# Patient Record
Sex: FEMALE | Race: WHITE | Employment: PART TIME | URBAN - METROPOLITAN AREA
[De-identification: names, ages, dates, MRNs, and addresses within clinical notes are randomized per-mention and may not be internally consistent; named-entity substitution may affect disease eponyms.]

---

## 2021-11-10 ENCOUNTER — HOSPITAL ENCOUNTER (OUTPATIENT)
Dept: PHYSICAL THERAPY | Age: 51
Discharge: HOME | End: 2021-11-10
Payer: OTHER GOVERNMENT

## 2021-11-10 PROCEDURE — 97140 MANUAL THERAPY 1/> REGIONS: CPT

## 2021-11-10 PROCEDURE — 97110 THERAPEUTIC EXERCISES: CPT

## 2021-11-10 PROCEDURE — 97162 PT EVAL MOD COMPLEX 30 MIN: CPT

## 2021-11-10 NOTE — PROGRESS NOTES
PHYSICAL THERAPY - DAILY TREATMENT NOTE    Patient Name: Jailyn Pacheco        Date: 11/10/2021  : 1970   YES Patient  Verified  Visit #:     Insurance: Payor: MARC / Plan: Ivonne Sharif / Product Type:  /      In time: 930 Out time:    Total Treatment Time: 60     BCBS/Medicare Time Tracking (below)   Total Timed Codes (min):  NA 1:1 Treatment Time:  NA     TREATMENT AREA =  Pain in left shoulder [M25.512]    SUBJECTIVE  Pain Level (on 0 to 10 scale):  2- 10   Medication Changes/New allergies or changes in medical history, any new surgeries or procedures? NO    If yes, update Summary List   Subjective Functional Status/Changes:  []  No changes reported     Patient is a 54 y.o. female who presents to [facility] with complaints of L shoulder pain following a fall onto L arm while playing tennis causing dislocation and avulsion fracture of L shoulder on 2021. Modalities Rationale:     decrease edema, decrease inflammation and decrease pain to improve patient's ability to perform pain-free ADLs.     min [] Estim, type/location:                                      []  att     []  unatt     []  w/US     []  w/ice    []  w/heat    min []  Mechanical Traction: type/lbs                   []  pro   []  sup   []  int   []  cont    []  before manual    []  after manual    min []  Ultrasound, settings/location:      min []  Iontophoresis w/ dexamethasone, location:                                               []  take home patch       []  in clinic   10 min [x]  Ice     []  Heat    location/position: L shoulder supine    min []  Vasopneumatic Device, press/temp:    If using vaso (only need to measure limb vaso being performed on)      pre-treatment girth :       post-treatment girth :       measured at (landmark location) :      min []  Other:    [x] Skin assessment post-treatment (if applicable):    [x]  intact    [x]  redness- no adverse reaction                  []redness  adverse reaction:        10 min Therapeutic Exercise:  [x]  See flow sheet   Rationale:      increase ROM, increase strength and improve coordination to improve the patients ability to perform unlimited ADLs. 10 min Manual Therapy: PROM tinto flexion, ER and abd with oscillations at end range   Rationale:      decrease pain, increase ROM, increase tissue extensibility and decrease edema  to improve patient's ability to improve ROM. The manual therapy interventions were performed at a separate and distinct time from the therapeutic activities interventions. Billed With/As:   [x] TE   [] TA   [] Neuro   [] Self Care Patient Education: [x] Review HEP    [] Progressed/Changed HEP based on:   [] positioning   [] body mechanics   [] transfers   [] heat/ice application    [] other:      Other Objective/Functional Measures:    FOTO 51     Post Treatment Pain Level (on 0 to 10) scale:   2  / 10     ASSESSMENT  Assessment/Changes in Function:     See POC     []  See Progress Note/Recertification   Patient will continue to benefit from skilled PT services to modify and progress therapeutic interventions, address functional mobility deficits, address ROM deficits, address strength deficits, analyze and address soft tissue restrictions, analyze and cue movement patterns, analyze and modify body mechanics/ergonomics and assess and modify postural abnormalities to attain remaining goals. Progress toward goals / Updated goals:    See POC for new short and long term goals.       PLAN  [x]  Upgrade activities as tolerated YES Continue plan of care   []  Discharge due to :    []  Other:      Therapist: Alan Guardado DPT    Date: 11/10/2021 Time: 1:27 PM     Future Appointments   Date Time Provider Carlos Mustafa   11/12/2021  2:15 PM Denzil Mcburney, PT [facility]   11/16/2021 10:15 AM Denzil Mcburney, PT [facility]   11/18/2021 10:15 AM Denzil Mcburney, PT [facility]   11/22/2021 11:00 AM Marcie Wasserman, PT [place] [facility] [place] [facility]   11/24/2021 11:00 AM Marcie Wasserman, PT [place] [facility] [place] [facility]   12/1/2021 10:15 AM Marcie Wasserman, PT [place] [facility] [place] [facility]   12/2/2021 10:15 AM Marcie Wasserman, PT [place] [facility] [place] [facility]   12/8/2021 10:15 AM Marcie Wasserman, PT [facility]   12/9/2021 11:00 AM Marcie Wasserman, PT [place] [facility] [place] [facility]   12/15/2021 10:15 AM Marcie Wasserman, PT [place] [facility] [place] [facility]   12/16/2021 10:15 AM Marcie Wasserman, PT [place] [facility] [place] [facility]   12/22/2021 10:15 AM Marcie Wasserman, [facility] [place] [facility] [place] [facility]   12/23/2021 10:15 AM Marcie Wasserman, PT [place] [facility] [place] [facility]   12/29/2021 10:15 AM Marcie Wasserman, PT [place] [facility] [place] [facility]   12/30/2021 10:15 AM Marcie Wasserman, PT [facility]

## 2021-11-10 NOTE — PROGRESS NOTES
[facility] PHYSICAL THERAPY AT [address] [place] [address]  [address], [address], [address]  Phone: [phone]  Fax: [phone] / [address] [address] PHYSICAL THERAPY SERVICES  Patient Name: Gisella Purvis : 1970   Medical   Diagnosis: Pain in left shoulder [M25.512] Treatment Diagnosis: L shoulder pain   Onset Date: 2021     Referral Source: Juliette Kaminski MD Start of Care [facility]): 11/10/2021   Prior Hospitalization: See medical history Provider #: [id number]   Prior Level of Function: Tennis and spin pain   Comorbidities: Recent weight gain   Medications: Verified on Patient Summary List   The Plan of Care and following information is based on the information from the initial evaluation.   ===========================================================================================  Assessment / key information:  Patient is a 54 y.o. female who presents to [facility] with complaints of L shoulder pain after a fall onto arm while playing tennis on 2021. Xrays showed L shoulder dislocation and avulsion fracture of greater tuberosity. Pt reported nerve pain after having shoulder relocated, which was managed by gabapentin and has since ceased. Pt was in a sling for 6 weeks, which was just removed 2021. Pt reports pain ranging from 2-6/10 which has increased since removal of sling; reports mild, short term relief with tylenol and naproxen. Pt presents today with:  L shoulder PROM: 105 deg flexion, 35 deg abd, 20 deg ER; dec posterior capsular mobility; restrictions noted in L pec muscle with notable rounded shoulder posture; MMT was withheld today due to fracture healing protocol. Patient sings and sx are consistent with pain/discomfort following L shoulder dislocation and humerus fracture. An initial HEP was provided to improve PROM; awaiting initiation of AROM until 8 weeks after injury.  Physical Therapy services will be beneficial in order to decrease pain, improve ROM, strength and stability in order to resume recreational activity and PLOF.   ===========================================================================================  Eval Complexity: History MEDIUM  Complexity : 1-2 comorbidities / personal factors will impact the outcome/ POC ;  Examination  MEDIUM Complexity : 3 Standardized tests and measures addressing body structure, function, activity limitation and / or participation in recreation ; Presentation MEDIUM Complexity : Evolving with changing characteristics ; Decision Making MEDIUM Complexity : FOTO score of 26-74; Overall Complexity MEDIUM  Problem List: pain affecting function, decrease ROM, decrease strength, edema affecting function, impaired gait/ balance, decrease ADL/ functional abilitiies, decrease activity tolerance and decrease flexibility/ joint mobility   Treatment Plan may include any combination of the following: Therapeutic exercise, Therapeutic activities, Neuromuscular re-education, Physical agent/modality, Gait/balance training, Manual therapy, Patient education, Self Care training and Functional mobility training  Patient / Family readiness to learn indicated by: asking questions, trying to perform skills and interest  Persons(s) to be included in education: patient (P)  Barriers to Learning/Limitations: None  Measures taken, if barriers to learning:    Patient Goal (s): \"ROM to return to tennis and work\"   Patient self reported health status: good  Rehabiliation Potential: good   Short Term Goals: To be accomplished in  4  weeks:  1. Patient will be independent and compliant with initial HEP. 2. Patient will report decreased pain levels to 0/10 in order to sleep through the night pain-free  3. Demonstrate improved L shoulder PROM to full and pain-free in order to improve dressing ability   Long Term Goals: To be accomplished in  8  weeks:  1.  Patient will be independent and compliant with progressive HEP for L shoulder stability/strength in order to maintain gains made with physical therapy  2. Improve FOTO score from 51 to > or = 70 in order to indicate improved ease with ADLs. 3. Demonstrate improved L shoulder strength to 4+/5 to improve patients ability to return to recreational activity  4. Demonstrate ability to perform 10 ball tosses without return of symptoms in order to improve pt ability to return to tennis. Frequency / Duration:   Patient to be seen  1-2  times per week for 6-8  weeks:  Patient / Caregiver education and instruction: self care, activity modification and exercises  Therapist Signature: Fay Barajas DPT Date: 33/12/3380   Certification Period: NA Time: 9:23 AM   ===========================================================================================  I certify that the above Physical Therapy Services are being furnished while the patient is under my care. I agree with the treatment plan and certify that this therapy is necessary. Physician Signature:        Date:       Time:     Please sign and return to [facility] [place] or you may fax the signed copy to [phone]. Thank you.

## 2021-11-12 ENCOUNTER — HOSPITAL ENCOUNTER (OUTPATIENT)
Dept: PHYSICAL THERAPY | Age: 51
Discharge: HOME | End: 2021-11-12
Payer: OTHER GOVERNMENT

## 2021-11-12 PROCEDURE — 97140 MANUAL THERAPY 1/> REGIONS: CPT

## 2021-11-12 PROCEDURE — 97110 THERAPEUTIC EXERCISES: CPT

## 2021-11-12 NOTE — PROGRESS NOTES
PHYSICAL THERAPY - DAILY TREATMENT NOTE    Patient Name: Seb Westbrook        Date: 2021  : 1970   YES Patient  Verified  Visit #:     Insurance: Payor:  / Plan: Alda Shermant / Product Type:  /      In time: 220 Out time: 300   Total Treatment Time: 40     BCBS/Medicare Time Tracking (below)   Total Timed Codes (min):  NA 1:1 Treatment Time:  NA     TREATMENT AREA =  Pain in left shoulder [M25.512]    SUBJECTIVE  Pain Level (on 0 to 10 scale):  2-3  / 10   Medication Changes/New allergies or changes in medical history, any new surgeries or procedures? NO    If yes, update Summary List   Subjective Functional Status/Changes:  []  No changes reported     I fell down the stairs two days ago and landed on my L arm. I dont think I hurt anything physically but it didn't feel good           Modalities Rationale:     decrease edema, decrease inflammation and decrease pain to improve patient's ability to perform pain-free ADLs.     min [] Estim, type/location:                                      []  att     []  unatt     []  w/US     []  w/ice    []  w/heat    min []  Mechanical Traction: type/lbs                   []  pro   []  sup   []  int   []  cont    []  before manual    []  after manual    min []  Ultrasound, settings/location:      min []  Iontophoresis w/ dexamethasone, location:                                               []  take home patch       []  in clinic   HEP min [x]  Ice     []  Heat    location/position: L shoulder supine    min []  Vasopneumatic Device, press/temp:    If using vaso (only need to measure limb vaso being performed on)      pre-treatment girth :       post-treatment girth :       measured at (landmark location) :      min []  Other:    [x] Skin assessment post-treatment (if applicable):    [x]  intact    [x]  redness- no adverse reaction                  []redness  adverse reaction:        30 min Therapeutic Exercise:  [x]  See flow sheet   Rationale:      increase ROM, increase strength and improve coordination to improve the patients ability to perform unlimited ADLs. 10 min Manual Therapy: PROM into flexion, ABD and ER with oscillations at end range to promote muscle relaxation   Rationale:      decrease pain, increase ROM, increase tissue extensibility and decrease edema  to improve patient's ability to improve PROM per protocol  The manual therapy interventions were performed at a separate and distinct time from the therapeutic activities interventions. Billed With/As:   [x] TE   [] TA   [] Neuro   [] Self Care Patient Education: [x] Review HEP    [] Progressed/Changed HEP based on:   [] positioning   [] body mechanics   [] transfers   [] heat/ice application    [] other:      Other Objective/Functional Measures:    Initiated TE per FS- continue with PROM and gentle AAROM until 8 weeks s/p fracture     Post Treatment Pain Level (on 0 to 10) scale:   2  / 10     ASSESSMENT  Assessment/Changes in Function:     Improved tolerance to stretching and AAROM with PT assist today following cues for scapular placement and prevention of UT recruitment. []  See Progress Note/Recertification   Patient will continue to benefit from skilled PT services to modify and progress therapeutic interventions, address functional mobility deficits, address ROM deficits, address strength deficits, analyze and address soft tissue restrictions, analyze and cue movement patterns, analyze and modify body mechanics/ergonomics and assess and modify postural abnormalities to attain remaining goals. Progress toward goals / Updated goals:    Progressing towards STG 3.       PLAN  [x]  Upgrade activities as tolerated YES Continue plan of care   []  Discharge due to :    []  Other:      Therapist: Marisa Watson DPT    Date: 11/12/2021 Time: 11:05 AM     Future Appointments   Date Time Provider Carlos Mustafa   11/12/2021  2:15 PM Adwoa Myrick, PT [facility]   11/16/2021 10:15 AM Emanuel Burgess, PT [facility]   11/18/2021 10:15 AM [facility] Burg[facility]   11/22/2021 11:00 AM [facility] Burg[facility], PT [facility] [place] [facility]   11/24/2021 11:00 AM Emanuel Burgess, PT [place] [facility] [place] [facility]   12/1/2021 10:15 AM [facility] [place] [facility]   12/2/2021 10:15 AM Emanuel Burgess, PT [facility] [place] [facility]   12/8/2021 10:15 AM Emanuel Burgess, PT [facility]   12/9/2021 11:00 AM Emanuel Burgess, PT [place] [facility] [place] [facility]   12/15/2021 10:15 AM Emanuel Burgess, PT [place] [facility] [place] [facility]   12/16/2021 10:15 AM Emanuel Burgess, PT [place] [facility] [place] [facility]   12/22/2021 10:15 AM Emanuel Burgess, PT [place] [facility] [place] [facility]   12/23/2021 10:15 AM Emanuel Burgess, PT [place] [facility] [place] [facility]   12/29/2021 10:15 AM Emanuel Burgess, PT [place] [facility] [place] [facility]   12/30/2021 10:15 AM Emanuel Burgess, PT [facility]

## 2021-11-16 ENCOUNTER — HOSPITAL ENCOUNTER (OUTPATIENT)
Dept: PHYSICAL THERAPY | Age: 51
Discharge: HOME | End: 2021-11-16
Payer: OTHER GOVERNMENT

## 2021-11-16 PROCEDURE — 97110 THERAPEUTIC EXERCISES: CPT

## 2021-11-16 PROCEDURE — 97140 MANUAL THERAPY 1/> REGIONS: CPT

## 2021-11-16 NOTE — PROGRESS NOTES
PHYSICAL THERAPY - DAILY TREATMENT NOTE    Patient Name: Johnna Mckeon        Date: 2021  : 1970   YES Patient  Verified  Visit #:   3   of   12  Insurance: Payor:  / Plan: Mychal Langston 76 / Product Type:  /      In time:  Out time:    Total Treatment Time: 55     BCBS/Medicare Time Tracking (below)   Total Timed Codes (min):  NA 1:1 Treatment Time:  NA     TREATMENT AREA =  Pain in left shoulder [M25.512]    SUBJECTIVE  Pain Level (on 0 to 10 scale):  2-3  / 10   Medication Changes/New allergies or changes in medical history, any new surgeries or procedures? NO    If yes, update Summary List   Subjective Functional Status/Changes:  []  No changes reported     Last night was the first night I slept without waking up from pain. I have a little pinching with some of my stretching, I stop when that happens so it doesn't get worse. Modalities Rationale:     decrease edema, decrease inflammation and decrease pain to improve patient's ability to perform pain-free ADLs.     min [] Estim, type/location:                                      []  att     []  unatt     []  w/US     []  w/ice    []  w/heat    min []  Mechanical Traction: type/lbs                   []  pro   []  sup   []  int   []  cont    []  before manual    []  after manual    min []  Ultrasound, settings/location:      min []  Iontophoresis w/ dexamethasone, location:                                               []  take home patch       []  in clinic   10 min []  Ice     []  Heat    location/position:     min []  Vasopneumatic Device, press/temp:    If using vaso (only need to measure limb vaso being performed on)      pre-treatment girth :       post-treatment girth :       measured at (landmark location) :      min []  Other:    [] Skin assessment post-treatment (if applicable):    []  intact    []  redness- no adverse reaction                  []redness  adverse reaction:        35 min Therapeutic Exercise:  [x]  See flow sheet   Rationale:      increase ROM, increase strength and improve coordination to improve the patients ability to perform unlimited ADLs. 10 min Manual Therapy: PROM into flexion, ER and abd with oscillations at end range to promote muscle relaxation; grade 1 posterior mobs of GHJ   Rationale:      decrease pain, increase ROM, increase tissue extensibility and decrease edema  to improve patient's ability to improve PROM per fracture protocol  The manual therapy interventions were performed at a separate and distinct time from the therapeutic activities interventions. Billed With/As:   [x] TE   [] TA   [] Neuro   [] Self Care Patient Education: [x] Review HEP    [] Progressed/Changed HEP based on:   [] positioning   [] body mechanics   [] transfers   [] heat/ice application    [] other:      Other Objective/Functional Measures: Added finger ladder, pulleys and prone row      Post Treatment Pain Level (on 0 to 10) scale:   2  / 10     ASSESSMENT  Assessment/Changes in Function:     Emphasis on today's treatment was targeting mobility to the posterior capsule during AROM and gentle stretching Pt tolerated new exercises well and reported that \"pinching\" decreased with cues for scapular placement and prevention of UT recruitment. []  See Progress Note/Recertification   Patient will continue to benefit from skilled PT services to modify and progress therapeutic interventions, address functional mobility deficits, address ROM deficits, address strength deficits, analyze and address soft tissue restrictions and analyze and cue movement patterns to attain remaining goals. Progress toward goals / Updated goals:    Progressing towards STG 3.       PLAN  [x]  Upgrade activities as tolerated YES Continue plan of care   []  Discharge due to :    []  Other:      Therapist: Enid Fleming DPT    Date: 11/16/2021 Time: 10:23 AM     Future Appointments   Date Time Provider Department Center   11/18/2021 10:15 AM Saint Agatha Fuse, PT [place] [facility] [place] [facility]   11/22/2021 11:00 AM Saint Agatha Fuse, PT [place] [facility] [place] [facility]   11/24/2021 11:00 AM Carol Fuse, PT [place] [facility] [place] [facility]   12/1/2021 10:15 AM Carol Fuse, PT [place] [facility] [place] [facility]   12/2/2021 10:15 AM [facility] [place] [facility]   12/8/2021 10:15 AM Acrol Fuse, PT [place] [facility] [place] [facility]   12/9/2021 11:00 AM [facility]use[facility] [place] [facility]   12/15/2021 10:15 AM Saint Agatha Fuse, PT [place] [facility] [place] [facility]   12/16/2021 10:15 AM Saint Agatha Fuse, PT [place] [facility] [place] [facility]   12/22/2021 10:15 AM [facility] [place] [facility]   12/23/2021 10:15 AM Carol Fuse, PT [place] [facility] [place] [facility]   12/29/2021 10:15 AM Saint Agatha Fuse, PT [place] [facility] [place] [facility]   12/30/2021 10:15 AM Saint Agatha Fuse, PT [facility]

## 2021-11-18 ENCOUNTER — HOSPITAL ENCOUNTER (OUTPATIENT)
Dept: PHYSICAL THERAPY | Age: 51
Discharge: HOME | End: 2021-11-18
Payer: OTHER GOVERNMENT

## 2021-11-18 PROCEDURE — 97140 MANUAL THERAPY 1/> REGIONS: CPT

## 2021-11-18 PROCEDURE — 97110 THERAPEUTIC EXERCISES: CPT

## 2021-11-18 NOTE — PROGRESS NOTES
PHYSICAL THERAPY - DAILY TREATMENT NOTE    Patient Name: Asmita Garcia        Date: 2021  : 1970   YES Patient  Verified  Visit #:     Insurance: Payor:  / Plan: Mychal Langston 74 / Product Type:  /      In time:  Out time:    Total Treatment Time: 55     BCBS/Medicare Time Tracking (below)   Total Timed Codes (min):  NA 1:1 Treatment Time:  NA     TREATMENT AREA =  Pain in left shoulder [M25.512]    SUBJECTIVE  Pain Level (on 0 to 10 scale):  2  / 10   Medication Changes/New allergies or changes in medical history, any new surgeries or procedures? NO    If yes, update Summary List   Subjective Functional Status/Changes:  []  No changes reported     I havent been as sore recently but I still cant put my hair up or normally get a shirt on. Modalities Rationale:     decrease edema, decrease inflammation and decrease pain to improve patient's ability to perform pain-free ADLs.     min [] Estim, type/location:                                      []  att     []  unatt     []  w/US     []  w/ice    []  w/heat    min []  Mechanical Traction: type/lbs                   []  pro   []  sup   []  int   []  cont    []  before manual    []  after manual    min []  Ultrasound, settings/location:      min []  Iontophoresis w/ dexamethasone, location:                                               []  take home patch       []  in clinic   10 min [x]  Ice     []  Heat    location/position: L shoulder    min []  Vasopneumatic Device, press/temp:    If using vaso (only need to measure limb vaso being performed on)      pre-treatment girth :       post-treatment girth :       measured at (landmark location) :      min []  Other:    [x] Skin assessment post-treatment (if applicable):    [x]  intact    [x]  redness- no adverse reaction                  []redness  adverse reaction:        35 min Therapeutic Exercise:  [x]  See flow sheet   Rationale:      increase ROM, increase strength and improve coordination to improve the patients ability to perform unlimited ADLs. 10 min Manual Therapy: PROM into flexion, ER and abd with oscillations at end range to promote muscle relaxation; grade 1 posterior mobs of GHJ   Rationale:      decrease pain, increase ROM, increase tissue extensibility and decrease edema  to improve patient's ability to improve PROM. The manual therapy interventions were performed at a separate and distinct time from the therapeutic activities interventions. Billed With/As:   [x] TE   [] TA   [] Neuro   [] Self Care Patient Education: [x] Review HEP    [] Progressed/Changed HEP based on:   [] positioning   [] body mechanics   [] transfers   [] heat/ice application    [] other:      Other Objective/Functional Measures: Added supine punch and hammock stretch     Post Treatment Pain Level (on 0 to 10) scale:   2  / 10     ASSESSMENT  Assessment/Changes in Function:     Pt often reports discomfort with first repetition of each exercise that improves with dec incidence of muscle guarding and cues for scapular retraction. []  See Progress Note/Recertification   Patient will continue to benefit from skilled PT services to modify and progress therapeutic interventions, address functional mobility deficits, address ROM deficits, address strength deficits, analyze and address soft tissue restrictions, analyze and cue movement patterns, analyze and modify body mechanics/ergonomics and assess and modify postural abnormalities to attain remaining goals. Progress toward goals / Updated goals:    Progressing towards STG 3.      PLAN  [x]  Upgrade activities as tolerated YES Continue plan of care   []  Discharge due to :    []  Other:      Therapist: Brianna Amaral DPT    Date: 11/18/2021 Time: 10:18 AM     Future Appointments   Date Time Provider Carlos Mustafa   11/22/2021 11:00 AM Kristene Severe, PT [facility] [place] [facility]   11/24/2021 11:00 AM Kristene Severe, PT [facility]   12/1/2021 10:15 AM Nimo Persaud, PT [facility]   12/2/2021 10:15 AM Nimo Persaud, PT [facility] [place] [facility]   12/8/2021 10:15 AM Nimo Persaud, PT [facility]   12/9/2021 11:00 AM Nimo Persaud, PT [facility] [place] [facility]   12/15/2021 10:15 AM Nimo Persaud, PT [facility] [place] [facility]   12/16/2021 10:15 AM Nimo Persaud, PT [facility] [place] [facility]   12/22/2021 10:15 AM Nimo Persaud, PT [facility] [place] [facility]   12/23/2021 10:15 AM Nimo Persaud, PT [facility] [place] [facility]   12/29/2021 10:15 AM Nimo Persaud, PT [facility] [place] [facility]   12/30/2021 10:15 AM Nimo Persaud, PT [facility]

## 2021-11-22 ENCOUNTER — HOSPITAL ENCOUNTER (OUTPATIENT)
Dept: PHYSICAL THERAPY | Age: 51
Discharge: HOME | End: 2021-11-22
Payer: OTHER GOVERNMENT

## 2021-11-22 PROCEDURE — 97530 THERAPEUTIC ACTIVITIES: CPT

## 2021-11-22 PROCEDURE — 97110 THERAPEUTIC EXERCISES: CPT

## 2021-11-22 NOTE — PROGRESS NOTES
[facility]YS[facility]ER[facility] AT [address] [place] [address]  [facility], [address] [place] [address], [address]  Phone: [phone]  Fax: [phone]  PROGRESS NOTE  Patient Name: Kaleb Robles : 1970   Treatment/Medical Diagnosis: Pain in left shoulder [M25.512]   Referral Source: Michele Lane MD     Date of Initial Visit: 11/10/2021 Attended Visits: 5 Missed Visits: 0     SUMMARY OF TREATMENT  Gentle stretching of L shoulder and capsule, PROM-AAROM and initiation of AROM to tolerance, manual therapy to improve mobility  CURRENT STATUS  Ms. Luis Dc has been seen for 4 follow up visits following a L shoulder dislocation and fracture on 2021. Pt was compliant with sling use for 6 weeks and has been tolerating initiation of PROM and AAROM without reports of increase in pain. ROM is showing consistent improvements and pt is reporting inc independence with dressing and grooming demands. Pt has tolerate initiation of gentle AROM exercises well, although is still unable to lift weighted objects at home and often uses UT compensations for elevation. See below for objective measures:    Goal/Measure of Progress Goal Met? 1. Patient will be independent and compliant with initial HEP. Status at last Eval: - Current Status: Independent and compliant 7 days/week yes   2. Patient will report decreased pain levels to 0/10 in order to sleep through the night pain-luis a   Status at last Eval: 2/10 at best  6/10 at worst Current Status: 0/10 at best  2-3/10 at worst  Partially Met   3. Demonstrate improved L shoulder PROM to full and pain-free in order to improve dressing ability   Status at last Eval: 105 deg flexion  35 deg abd  20 deg ER Current Status: 139 deg flexion  88 deg abd  60 deg ER Progressing     New Goals to be achieved in __4-6__  weeks:  1.   Patient will be independent and compliant with progressive HEP for L shoulder stability/strength in order to maintain gains made with physical therapy   2. Improve FOTO score from 51 to > or = 70 in order to indicate improved ease with ADLs. 3.  Demonstrate improved L shoulder strength to 4+/5 to improve patients ability to return to recreational activity   4. Demonstrate ability to perform 10 ball tosses without return of symptoms in order to improve pt ability to return to tennis. RECOMMENDATIONS  Continue with skilled PT services 2x/week for 4-6 weeks to continue progressing towards goals and safely return to tennis. Pt is currently Thank you! If you have any questions/comments please contact us directly at [phone]. Thank you for allowing us to assist in the care of your patient. Therapist Signature: Milagros Lane DPT Date: 11/22/2021     Time: 11:19 AM   NOTE TO PHYSICIAN:  PLEASE COMPLETE THE ORDERS BELOW AND FAX TO   [facility]: [phone]. If you are unable to process this request in 24 hours please contact our office: [phone].    ___ I have read the above report and request that my patient continue as recommended.   ___ I have read the above report and request that my patient continue therapy with the following changes/special instructions:_________________________________________________________   ___ I have read the above report and request that my patient be discharged from therapy.      Physician Signature:        Date:       Time:

## 2021-11-22 NOTE — PROGRESS NOTES
PHYSICAL THERAPY - DAILY TREATMENT NOTE    Patient Name: Asmita Garcia        Date: 2021  : 1970   YES Patient  Verified  Visit #:     Insurance: Payor:  / Plan: Mychal Langston 74 / Product Type:  /      In time: 1100 Out time: 9570   Total Treatment Time: 55     BCBS/Medicare Time Tracking (below)   Total Timed Codes (min):  NA 1:1 Treatment Time:  NA     TREATMENT AREA =  Pain in left shoulder [M25.512]    SUBJECTIVE  Pain Level (on 0 to 10 scale):  1-2  / 10   Medication Changes/New allergies or changes in medical history, any new surgeries or procedures? NO    If yes, update Summary List   Subjective Functional Status/Changes:  []  No changes reported     I can get my hand to the top of my head to brush it but it gets really tired. Modalities Rationale:     decrease edema, decrease inflammation and decrease pain to improve patient's ability to perform pain-free ADLs.     min [] Estim, type/location:                                      []  att     []  unatt     []  w/US     []  w/ice    []  w/heat    min []  Mechanical Traction: type/lbs                   []  pro   []  sup   []  int   []  cont    []  before manual    []  after manual    min []  Ultrasound, settings/location:      min []  Iontophoresis w/ dexamethasone, location:                                               []  take home patch       []  in clinic   10 min [x]  Ice     []  Heat    location/position: L shoulder supine    min []  Vasopneumatic Device, press/temp:    If using vaso (only need to measure limb vaso being performed on)      pre-treatment girth :       post-treatment girth :       measured at (landmark location) :      min []  Other:    [x] Skin assessment post-treatment (if applicable):    [x]  intact    [x]  redness- no adverse reaction                  []redness  adverse reaction:        30 min Therapeutic Exercise:  [x]  See flow sheet   Rationale:      increase ROM, increase strength, improve coordination and increase proprioception to improve the patients ability to perform unlimited ADLs. 15 min Therapeutic Activity: [x]  See flow sheet   Rationale:    increase strength, improve coordination and increase proprioception to improve the patients ability to improve reaching abilities. Billed With/As:   [x] TE   [] TA   [] Neuro   [] Self Care Patient Education: [x] Review HEP    [] Progressed/Changed HEP based on:   [] positioning   [] body mechanics   [] transfers   [] heat/ice application    [] other:      Other Objective/Functional Measures:    See PN    Added SL ER and sleeper stretch     Post Treatment Pain Level (on 0 to 10) scale:   2  / 10     ASSESSMENT  Assessment/Changes in Function:     See PN to MD     []  See Progress Note/Recertification   Patient will continue to benefit from skilled PT services to modify and progress therapeutic interventions, address functional mobility deficits, address ROM deficits, address strength deficits, analyze and address soft tissue restrictions, analyze and cue movement patterns, analyze and modify body mechanics/ergonomics and assess and modify postural abnormalities to attain remaining goals. Progress toward goals / Updated goals:    See PN for progress towards goals.       PLAN  [x]  Upgrade activities as tolerated YES Continue plan of care   []  Discharge due to :    []  Other:      Therapist: Arely Champion DPT    Date: 11/22/2021 Time: 11:16 AM     Future Appointments   Date Time Provider Carlos Mustafa   11/24/2021 11:00 AM Boom Catalan, PT [facility]   12/1/2021 10:15 AM Boom Catalan, PT [facility]   12/2/2021 10:15 AM Boom Catalan, PT [facility]   12/8/2021 10:15 AM Boom Catalan, PT [facility]   12/9/2021 11:00 AM Boom Catalan, PT [facility]   12/15/2021 10:15 AM Boom Catalan, PT [facility]   12/16/2021 10:15 AM Boom Catalan, PT [facility]   12/22/2021 10:15 AM [facility]   12/23/2021 10:15 AM Kathie Mukherjee, PT [facility]   12/29/2021 10:15 AM Kathie Mukherjee, PT [facility]   12/30/2021 10:15 AM Kathie Mukherjee, PT [facility]

## 2021-11-24 ENCOUNTER — HOSPITAL ENCOUNTER (OUTPATIENT)
Dept: PHYSICAL THERAPY | Age: 51
Discharge: HOME | End: 2021-11-24
Payer: OTHER GOVERNMENT

## 2021-11-24 PROCEDURE — 97110 THERAPEUTIC EXERCISES: CPT

## 2021-11-24 PROCEDURE — 97530 THERAPEUTIC ACTIVITIES: CPT

## 2021-11-24 PROCEDURE — 97140 MANUAL THERAPY 1/> REGIONS: CPT

## 2021-11-24 NOTE — PROGRESS NOTES
PHYSICAL THERAPY - DAILY TREATMENT NOTE    Patient Name: Leslye Lindsay        Date: 2021  : 1970   YES Patient  Verified  Visit #:      12  Insurance: Payor:  / Plan: Mychal Langston 74 / Product Type:  /      In time:  Out time:    Total Treatment Time: 55     BCBS/Medicare Time Tracking (below)   Total Timed Codes (min):  NA 1:1 Treatment Time:  NA     TREATMENT AREA =  Pain in left shoulder [M25.512]    SUBJECTIVE  Pain Level (on 0 to 10 scale):  1-2  / 10   Medication Changes/New allergies or changes in medical history, any new surgeries or procedures? NO    If yes, update Summary List   Subjective Functional Status/Changes:  []  No changes reported     The doctor said things look good and I can go back to work next week with a 10 lb weight limit. Modalities Rationale:     decrease edema, decrease inflammation and decrease pain to improve patient's ability to perform pain-free ADLs.     min [] Estim, type/location:                                      []  att     []  unatt     []  w/US     []  w/ice    []  w/heat    min []  Mechanical Traction: type/lbs                   []  pro   []  sup   []  int   []  cont    []  before manual    []  after manual    min []  Ultrasound, settings/location:      min []  Iontophoresis w/ dexamethasone, location:                                               []  take home patch       []  in clinic   10 min [x]  Ice     []  Heat    location/position: L shoulder supine    min []  Vasopneumatic Device, press/temp:    If using vaso (only need to measure limb vaso being performed on)      pre-treatment girth :       post-treatment girth :       measured at (landmark location) :      min []  Other:    [x] Skin assessment post-treatment (if applicable):    [x]  intact    [x]  redness- no adverse reaction                  []redness  adverse reaction:        20 min Therapeutic Exercise:  [x]  See flow sheet   Rationale: increase ROM, increase strength and improve coordination to improve the patients ability to perform unlimited ADLs. 10 min Manual Therapy: PROM into flexion, ER and abd with and without scapular stabilization; grade 1 posterior/inferior mobs of GHJ   Rationale:      decrease pain, increase ROM, increase tissue extensibility and decrease edema  to improve patient's ability to improve ROM  The manual therapy interventions were performed at a separate and distinct time from the therapeutic activities interventions. 15 min Therapeutic Activity: [x]  See flow sheet   Rationale:    increase ROM, increase strength, improve coordination and improve balance to improve the patients ability to resume reaching activities    Billed With/As:   [x] TE   [] TA   [] Neuro   [] Self Care Patient Education: [x] Review HEP    [] Progressed/Changed HEP based on:   [] positioning   [] body mechanics   [] transfers   [] heat/ice application    [] other:      Other Objective/Functional Measures: Added supine punch 45 and 60 deg elevation, added SL abd     Post Treatment Pain Level (on 0 to 10) scale:   1  / 10     ASSESSMENT  Assessment/Changes in Function:     Continues to show improvements in ROM, noted mild discomfort and stretch to posterior capsule today that occurred without impingement symptoms. Encouraged pt to continue with RC strengthening exercises at home to progress towards OH motions. []  See Progress Note/Recertification   Patient will continue to benefit from skilled PT services to modify and progress therapeutic interventions, address functional mobility deficits, address ROM deficits, address strength deficits, analyze and address soft tissue restrictions, analyze and cue movement patterns, analyze and modify body mechanics/ergonomics and assess and modify postural abnormalities to attain remaining goals. Progress toward goals / Updated goals:    Progressing towards STG 3.       PLAN  [x]  Upgrade activities as tolerated YES Continue plan of care   []  Discharge due to :    []  Other:      Therapist: Phyllis Chow DPT    Date: 11/24/2021 Time: 11:50 AM     Future Appointments   Date Time Provider Carlos Mustafa   12/1/2021 10:15 AM Carla Jewish, PT [facility]   12/2/2021 10:15 AM Carla Jewish, PT [facility]   12/8/2021 10:15 AM Carla Jewish, PT [facility]   12/9/2021 11:00 AM Carla Jewish, PT [facility]   12/15/2021 10:15 AM Carla Jewish, PT [facility]   12/16/2021 10:15 AM Carla Jewish, PT [facility]   12/22/2021 10:15 AM Carla Jewish, PT [facility]   12/23/2021 10:15 AM Carla Jewish, PT [facility]   12/29/2021 10:15 AM Carla Jewish, PT [facility]   12/30/2021 10:15 AM Carla Jewish, PT [facility]

## 2021-12-01 ENCOUNTER — APPOINTMENT (OUTPATIENT)
Dept: PHYSICAL THERAPY | Age: 51
End: 2021-12-01
Payer: OTHER GOVERNMENT

## 2021-12-02 ENCOUNTER — HOSPITAL ENCOUNTER (OUTPATIENT)
Dept: PHYSICAL THERAPY | Age: 51
Discharge: HOME | End: 2021-12-02
Payer: OTHER GOVERNMENT

## 2021-12-02 PROCEDURE — 97140 MANUAL THERAPY 1/> REGIONS: CPT

## 2021-12-02 PROCEDURE — 97530 THERAPEUTIC ACTIVITIES: CPT

## 2021-12-02 PROCEDURE — 97110 THERAPEUTIC EXERCISES: CPT

## 2021-12-02 NOTE — PROGRESS NOTES
PHYSICAL THERAPY - DAILY TREATMENT NOTE    Patient Name: Leslye Lindsay        Date: 2021  : 1970   YES Patient  Verified  Visit #:     Insurance: Payor: MARC / Plan: Mychal Langston 74 / Product Type:  /      In time: 8413 Out time:    Total Treatment Time: 55     BCBS/Medicare Time Tracking (below)   Total Timed Codes (min):  NA 1:1 Treatment Time:  NA     TREATMENT AREA =  Pain in left shoulder [M25.512]    SUBJECTIVE  Pain Level (on 0 to 10 scale):  1-2  / 10   Medication Changes/New allergies or changes in medical history, any new surgeries or procedures? NO    If yes, update Summary List   Subjective Functional Status/Changes:  []  No changes reported     I started working this week and it just felt super tired with everything I did. 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 Modalities Rationale:     decrease edema, decrease inflammation and decrease pain to improve patient's ability to perform pain-free ADLs.     min [] Estim, type/location:                                      []  att     []  unatt     []  w/US     []  w/ice    []  w/heat    min []  Mechanical Traction: type/lbs                   []  pro   []  sup   []  int   []  cont    []  before manual    []  after manual    min []  Ultrasound, settings/location:      min []  Iontophoresis w/ dexamethasone, location:                                               []  take home patch       []  in clinic   10 min [x]  Ice     []  Heat    location/position: L shoulder supine    min []  Vasopneumatic Device, press/temp:    If using vaso (only need to measure limb vaso being performed on)      pre-treatment girth :       post-treatment girth :       measured at (landmark location) :      min []  Other:    [x] Skin assessment post-treatment (if applicable):    [x]  intact    [x]  redness- no adverse reaction                  []redness  adverse reaction: 20 min Therapeutic Exercise:  [x]  See flow sheet   Rationale:      increase ROM, increase strength and improve coordination to improve the patients ability to perform unlimited ADLs. 10 min Manual Therapy: PROM into flexion, ER and abd with and without scapular stabilization; grade 1 posterior/inferior mobs of GHJ   Rationale:      decrease pain, increase ROM, increase tissue extensibility and decrease edema  to improve patient's ability to improve ROM  The manual therapy interventions were performed at a separate and distinct time from the therapeutic activities interventions. 15 min Therapeutic Activity: [x]  See flow sheet   Rationale:    increase ROM, increase strength, improve coordination and improve balance to improve the patients ability to resume reaching activities    Billed With/As:   [x] TE   [] TA   [] Neuro   [] Self Care Patient Education: [x] Review HEP    [] Progressed/Changed HEP based on:   [] positioning   [] body mechanics   [] transfers   [] heat/ice application    [] other:      Other Objective/Functional Measures: Added supine protraction and RC circles, 1# weight to SL ER/ABD, 2# to prone row     Post Treatment Pain Level (on 0 to 10) scale:   1  / 10     ASSESSMENT  Assessment/Changes in Function:     Pt was quick to fatigue throughout all new exercises today but reported no pain. Encouraged to take a rest day tomorrow from strengthening exercises and continue with stretching in order to prevent inflammation in L shoulder. []  See Progress Note/Recertification   Patient will continue to benefit from skilled PT services to modify and progress therapeutic interventions, address functional mobility deficits, address ROM deficits, address strength deficits, analyze and address soft tissue restrictions, analyze and cue movement patterns, analyze and modify body mechanics/ergonomics and assess and modify postural abnormalities to attain remaining goals.    Progress toward goals / Updated goals:    Progressing towards STG 3.       PLAN  [x]  Upgrade activities as tolerated YES Continue plan of care   []  Discharge due to :    []  Other:      Therapist: Maxime Ochoa DPT    Date: 12/2/2021 Time: 11:50 AM     Future Appointments   Date Time Provider Carlos Mustafa   12/8/2021 10:15 AM Marlyn Ortiz, PT [facility]   12/9/2021 11:00 AM Marlyn Ortiz, PT [facility]   12/15/2021 10:15 AM Marlyn Holiday, PT [facility]   12/16/2021 10:15 AM Marlyn Holiday, PT [facility]   12/22/2021 10:15 AM Marlyn Holiday, PT [facility]   12/23/2021 10:15 AM Marlyn Holiday, PT [facility]   12/29/2021 10:15 AM Marlyn Holiday, PT [facility]   12/30/2021 10:15 AM Marlyn Holiday, PT [facility]

## 2021-12-08 ENCOUNTER — APPOINTMENT (OUTPATIENT)
Dept: PHYSICAL THERAPY | Age: 51
End: 2021-12-08
Payer: OTHER GOVERNMENT

## 2021-12-09 ENCOUNTER — HOSPITAL ENCOUNTER (OUTPATIENT)
Dept: PHYSICAL THERAPY | Age: 51
Discharge: HOME | End: 2021-12-09
Payer: OTHER GOVERNMENT

## 2021-12-09 PROCEDURE — 97110 THERAPEUTIC EXERCISES: CPT

## 2021-12-09 PROCEDURE — 97530 THERAPEUTIC ACTIVITIES: CPT

## 2021-12-09 PROCEDURE — 97140 MANUAL THERAPY 1/> REGIONS: CPT

## 2021-12-09 NOTE — PROGRESS NOTES
PHYSICAL THERAPY - DAILY TREATMENT NOTE    Patient Name: Johnna Mckeon        Date: 2021  : 1970   YES Patient  Verified  Visit #:     Insurance: Payor:  / Plan: Mychal Langston 74 / Product Type:  /      In time: 1100 Out time: 1200   Total Treatment Time: 55     BCBS/Medicare Time Tracking (below)   Total Timed Codes (min):  NA 1:1 Treatment Time:  NA     TREATMENT AREA =  Pain in left shoulder [M25.512]    SUBJECTIVE  Pain Level (on 0 to 10 scale):  0  / 10   Medication Changes/New allergies or changes in medical history, any new surgeries or procedures? NO    If yes, update Summary List   Subjective Functional Status/Changes:  []  No changes reported     I only feel pain when my arm works against resistance. Modalities Rationale:     decrease edema, decrease inflammation and decrease pain to improve patient's ability to perform pain-free ADLs.     min [] Estim, type/location:                                      []  att     []  unatt     []  w/US     []  w/ice    []  w/heat    min []  Mechanical Traction: type/lbs                   []  pro   []  sup   []  int   []  cont    []  before manual    []  after manual    min []  Ultrasound, settings/location:      min []  Iontophoresis w/ dexamethasone, location:                                               []  take home patch       []  in clinic   10 min [x]  Ice     []  Heat    location/position: L shoulder supine    min []  Vasopneumatic Device, press/temp:    If using vaso (only need to measure limb vaso being performed on)      pre-treatment girth :       post-treatment girth :       measured at (landmark location) :      min []  Other:    [x] Skin assessment post-treatment (if applicable):    [x]  intact    [x]  redness- no adverse reaction                  []redness  adverse reaction:        20 min Therapeutic Exercise:  [x]  See flow sheet   Rationale:      increase ROM, increase strength, improve coordination and improve balance to improve the patients ability to perform unlimited ADLs. 10 min Manual Therapy: PROM into flexion, ER and abd with and without scapular stabilization; grade 1 posterior/inferior mobs of GHJ   Rationale:      decrease pain, increase ROM, increase tissue extensibility and decrease edema  to improve patient's ability to improve PROM   The manual therapy interventions were performed at a separate and distinct time from the therapeutic activities interventions. 15 min Therapeutic Activity: [x]  See flow sheet   Rationale:    increase strength, improve coordination and increase proprioception to improve the patients ability to resume overhead activities    Billed With/As:   [x] TE   [] TA   [] Neuro   [] Self Care Patient Education: [x] Review HEP    [] Progressed/Changed HEP based on:   [] positioning   [] body mechanics   [] transfers   [] heat/ice application    [] other:      Other Objective/Functional Measures: Added prone horz abd, Tband IT/ER and FR on wall     Post Treatment Pain Level (on 0 to 10) scale:   1  / 10     ASSESSMENT  Assessment/Changes in Function:     Good tolerance to new exercises although pt was quick to fatigue with all scapular and RC strengthening exercises. Encouraged to utilize scapular musculature with lifting exercises at work. []  See Progress Note/Recertification   Patient will continue to benefit from skilled PT services to modify and progress therapeutic interventions, address functional mobility deficits, address ROM deficits, address strength deficits, analyze and address soft tissue restrictions, analyze and cue movement patterns, analyze and modify body mechanics/ergonomics and assess and modify postural abnormalities to attain remaining goals. Progress toward goals / Updated goals:    Progressing towards LTG 3.       PLAN  [x]  Upgrade activities as tolerated YES Continue plan of care   []  Discharge due to :    []  Other: Therapist: Enid Fleming DPT    Date: 12/9/2021 Time: 11:10 AM     Future Appointments   Date Time Provider Carlos Mustafa   12/15/2021 10:15 AM Eligio Mayberry, PT [facility]   12/16/2021 10:15 AM Eligio Mayberry, PT [facility]   12/22/2021 10:15 AM Eligio Mayberry, PT [facility]   12/23/2021 10:15 AM Eligio Mayberry, PT [facility]   12/29/2021 10:15 AM Eligio Mayberry, PT [facility]   12/30/2021 10:15 AM Eligio Mayberry, PT [facility]

## 2021-12-15 ENCOUNTER — HOSPITAL ENCOUNTER (OUTPATIENT)
Dept: PHYSICAL THERAPY | Age: 51
Discharge: HOME | End: 2021-12-15
Payer: OTHER GOVERNMENT

## 2021-12-15 PROCEDURE — 97530 THERAPEUTIC ACTIVITIES: CPT

## 2021-12-15 PROCEDURE — 97110 THERAPEUTIC EXERCISES: CPT

## 2021-12-15 PROCEDURE — 97140 MANUAL THERAPY 1/> REGIONS: CPT

## 2021-12-15 NOTE — PROGRESS NOTES
PHYSICAL THERAPY - DAILY TREATMENT NOTE    Patient Name: Johnna Mckeon        Date: 12/15/2021  : 1970   YES Patient  Verified  Visit #:     Insurance: Payor:  / Plan: Mychal Langston 74 / Product Type:  /      In time: 6594 Out time: 9891   Total Treatment Time: 60     BCBS/Medicare Time Tracking (below)   Total Timed Codes (min):  NA 1:1 Treatment Time:  NA     TREATMENT AREA =  Pain in left shoulder [M25.512]    SUBJECTIVE  Pain Level (on 0 to 10 scale):  0  / 10   Medication Changes/New allergies or changes in medical history, any new surgeries or procedures? NO    If yes, update Summary List   Subjective Functional Status/Changes:  []  No changes reported     I have trouble only when im reaching high. I just use the other arm. Modalities Rationale:     decrease edema, decrease inflammation and decrease pain to improve patient's ability to perform pain-free ADLs.     min [] Estim, type/location:                                      []  att     []  unatt     []  w/US     []  w/ice    []  w/heat    min []  Mechanical Traction: type/lbs                   []  pro   []  sup   []  int   []  cont    []  before manual    []  after manual    min []  Ultrasound, settings/location:      min []  Iontophoresis w/ dexamethasone, location:                                               []  take home patch       []  in clinic   10 min [x]  Ice     []  Heat    location/position: L shoulder supine    min []  Vasopneumatic Device, press/temp:    If using vaso (only need to measure limb vaso being performed on)      pre-treatment girth :       post-treatment girth :       measured at (landmark location) :      min []  Other:    [x] Skin assessment post-treatment (if applicable):    [x]  intact    [x]  redness- no adverse reaction                  []redness  adverse reaction:        20 min Therapeutic Exercise:  [x]  See flow sheet   Rationale:      increase ROM, increase strength, improve coordination and improve balance to improve the patients ability to perform unlimited ADLs. 10 min Manual Therapy: PROM into flexion, ER and abd with and without scapular stabilization; grade 1 posterior/inferior mobs of GHJ   Rationale:      decrease pain, increase ROM, increase tissue extensibility and decrease edema  to improve patient's ability to improve PROM   The manual therapy interventions were performed at a separate and distinct time from the therapeutic activities interventions. 20 min Therapeutic Activity: [x]  See flow sheet   Rationale:    increase strength, improve coordination and increase proprioception to improve the patients ability to resume overhead activities    Billed With/As:   [x] TE   [] TA   [] Neuro   [] Self Care Patient Education: [x] Review HEP    [] Progressed/Changed HEP based on:   [] positioning   [] body mechanics   [] transfers   [] heat/ice application    [] other:      Other Objective/Functional Measures: Added supine punch with YTB for ER resistance, increased to OTB with IR/ER     Post Treatment Pain Level (on 0 to 10) scale:   1  / 10     ASSESSMENT  Assessment/Changes in Function:     No increase in symptoms during therex today although pt is quick to fatigue and must be cued to prevent compensations. []  See Progress Note/Recertification   Patient will continue to benefit from skilled PT services to modify and progress therapeutic interventions, address functional mobility deficits, address ROM deficits, address strength deficits, analyze and address soft tissue restrictions, analyze and cue movement patterns, analyze and modify body mechanics/ergonomics and assess and modify postural abnormalities to attain remaining goals. Progress toward goals / Updated goals:    Progressing towards LTG 3.       PLAN  [x]  Upgrade activities as tolerated YES Continue plan of care   []  Discharge due to :    []  Other:      Therapist: Jania Seay DPT    Date: 12/15/2021 Time: 11:10 AM     Future Appointments   Date Time Provider Carlos Mustafa   12/21/2021  8:00 AM Parisa Gibson, PT [facility]   12/22/2021 10:15 AM aPrisa Gibson, PT [facility]   12/29/2021 10:15 AM Parisa Gibson, PT [facility]   12/30/2021 10:15 AM Parisa Gibson, PT [facility]

## 2021-12-16 ENCOUNTER — APPOINTMENT (OUTPATIENT)
Dept: PHYSICAL THERAPY | Age: 51
End: 2021-12-16
Payer: OTHER GOVERNMENT

## 2021-12-16 ENCOUNTER — HOSPITAL ENCOUNTER (OUTPATIENT)
Dept: PHYSICAL THERAPY | Age: 51
Discharge: HOME | End: 2021-12-16
Payer: OTHER GOVERNMENT

## 2021-12-16 PROCEDURE — 97530 THERAPEUTIC ACTIVITIES: CPT

## 2021-12-16 PROCEDURE — 97110 THERAPEUTIC EXERCISES: CPT

## 2021-12-16 PROCEDURE — 97140 MANUAL THERAPY 1/> REGIONS: CPT

## 2021-12-16 NOTE — PROGRESS NOTES
PHYSICAL THERAPY - DAILY TREATMENT NOTE    Patient Name: Wilmer Boykin        Date: 2021  : 1970   YES Patient  Verified  Visit #:   10   of   12  Insurance: Payor: MARC / Plan: Mychal Langston 74 / Product Type:  /      In time: 9310 Out time:    Total Treatment Time: 55     BCBS/Medicare Time Tracking (below)   Total Timed Codes (min):  NA 1:1 Treatment Time:  NA     TREATMENT AREA =  Pain in left shoulder [M25.512]    SUBJECTIVE  Pain Level (on 0 to 10 scale):  0  / 10   Medication Changes/New allergies or changes in medical history, any new surgeries or procedures? NO    If yes, update Summary List   Subjective Functional Status/Changes:  []  No changes reported     Im not sore from yesterday, I just feel tight. Modalities Rationale:     decrease edema, decrease inflammation and decrease pain to improve patient's ability to perform pain-free ADLs.     min [] Estim, type/location:                                      []  att     []  unatt     []  w/US     []  w/ice    []  w/heat    min []  Mechanical Traction: type/lbs                   []  pro   []  sup   []  int   []  cont    []  before manual    []  after manual    min []  Ultrasound, settings/location:      min []  Iontophoresis w/ dexamethasone, location:                                               []  take home patch       []  in clinic   10 min [x]  Ice     []  Heat    location/position: L shoulder supine    min []  Vasopneumatic Device, press/temp:    If using vaso (only need to measure limb vaso being performed on)      pre-treatment girth :       post-treatment girth :       measured at (landmark location) :      min []  Other:    [x] Skin assessment post-treatment (if applicable):    [x]  intact    [x]  redness- no adverse reaction                  []redness  adverse reaction:        15 min Therapeutic Exercise:  [x]  See flow sheet   Rationale:      increase ROM, increase strength, improve coordination and improve balance to improve the patients ability to perform unlimited ADLs. 10 min Manual Therapy: PROM into flexion, ER and abd with and without scapular stabilization; grade 1 posterior/inferior mobs of GHJ   Rationale:      decrease pain, increase ROM, increase tissue extensibility and decrease edema  to improve patient's ability to improve PROM   The manual therapy interventions were performed at a separate and distinct time from the therapeutic activities interventions. 20 min Therapeutic Activity: [x]  See flow sheet   Rationale:    increase strength, improve coordination and increase proprioception to improve the patients ability to resume overhead activities    Billed With/As:   [x] TE   [] TA   [] Neuro   [] Self Care Patient Education: [x] Review HEP    [] Progressed/Changed HEP based on:   [] positioning   [] body mechanics   [] transfers   [] heat/ice application    [] other:      Other Objective/Functional Measures: Added counter walk back flexion stretch and corner pec stretch   Post Treatment Pain Level (on 0 to 10) scale:   1  / 10     ASSESSMENT  Assessment/Changes in Function:     Continues to lack approx 20 deg flexion and was able to reach only 90 deg scaption prior to UT compensations. []  See Progress Note/Recertification   Patient will continue to benefit from skilled PT services to modify and progress therapeutic interventions, address functional mobility deficits, address ROM deficits, address strength deficits, analyze and address soft tissue restrictions, analyze and cue movement patterns, analyze and modify body mechanics/ergonomics and assess and modify postural abnormalities to attain remaining goals. Progress toward goals / Updated goals:    Progressing towards LTG 3.       PLAN  [x]  Upgrade activities as tolerated YES Continue plan of care   []  Discharge due to :    []  Other:      Therapist: Diego Bernardo DPT    Date: 12/16/2021 Time: 11:10 AM Future Appointments   Date Time Provider Carlos Mustafa   12/21/2021  8:00 AM Benny Carrillo, PT [facility]   12/22/2021 10:15 AM Benny Carrillo, PT [facility]   12/29/2021 10:15 AM Benny Carrillo PT [facility]   12/30/2021 10:15 AM Benny Carrillo, PT [facility]

## 2021-12-21 ENCOUNTER — HOSPITAL ENCOUNTER (OUTPATIENT)
Dept: PHYSICAL THERAPY | Age: 51
Discharge: HOME | End: 2021-12-21
Payer: OTHER GOVERNMENT

## 2021-12-21 PROCEDURE — 97140 MANUAL THERAPY 1/> REGIONS: CPT

## 2021-12-21 PROCEDURE — 97110 THERAPEUTIC EXERCISES: CPT

## 2021-12-21 PROCEDURE — 97530 THERAPEUTIC ACTIVITIES: CPT

## 2021-12-21 NOTE — PROGRESS NOTES
PHYSICAL THERAPY - DAILY TREATMENT NOTE    Patient Name: Merlene Tang        Date: 2021  : 1970   YES Patient  Verified  Visit #:     Insurance: Payor: MARC / Plan: Mychal Langston 74 / Product Type:  /      In time: 800 Out time: 900   Total Treatment Time: 55     BCBS/Medicare Time Tracking (below)   Total Timed Codes (min):  NA 1:1 Treatment Time:  NA     TREATMENT AREA =  Pain in left shoulder [M25.512]    SUBJECTIVE  Pain Level (on 0 to 10 scale):  0  / 10   Medication Changes/New allergies or changes in medical history, any new surgeries or procedures? NO    If yes, update Summary List   Subjective Functional Status/Changes:  []  No changes reported     Main trouble is picking up IV bags with my L hand. Paulo Varner been having a lot of pinching in the back side of my shoulder. Modalities Rationale:     decrease edema, decrease inflammation and decrease pain to improve patient's ability to perform pain-free ADLs.     min [] Estim, type/location:                                      []  att     []  unatt     []  w/US     []  w/ice    []  w/heat    min []  Mechanical Traction: type/lbs                   []  pro   []  sup   []  int   []  cont    []  before manual    []  after manual    min []  Ultrasound, settings/location:      min []  Iontophoresis w/ dexamethasone, location:                                               []  take home patch       []  in clinic   10 min [x]  Ice     []  Heat    location/position: L shoulder supine    min []  Vasopneumatic Device, press/temp:    If using vaso (only need to measure limb vaso being performed on)      pre-treatment girth :       post-treatment girth :       measured at (landmark location) :      min []  Other:    [x] Skin assessment post-treatment (if applicable):    [x]  intact    [x]  redness- no adverse reaction                  []redness  adverse reaction:        15 min Therapeutic Exercise:  [x]  See flow sheet Rationale:      increase ROM, increase strength, improve coordination and improve balance to improve the patients ability to perform unlimited ADLs. 10 min Manual Therapy: PROM into flexion, ER and abd with and without scapular stabilization; grade 1 posterior/inferior mobs of GHJ   Rationale:      decrease pain, increase ROM, increase tissue extensibility and decrease edema  to improve patient's ability to improve PROM   The manual therapy interventions were performed at a separate and distinct time from the therapeutic activities interventions. 20 min Therapeutic Activity: [x]  See flow sheet   Rationale:    increase strength, improve coordination and increase proprioception to improve the patients ability to resume overhead activities    Billed With/As:   [x] TE   [] TA   [] Neuro   [] Self Care Patient Education: [x] Review HEP    [] Progressed/Changed HEP based on:   [] positioning   [] body mechanics   [] transfers   [] heat/ice application    [] other: educated pt on performing strengthening exercises only 3x/week to avoid inflammation and tendinitis in RC musculature     Other Objective/Functional Measures:    90 deg scaption before UT recruitment   Post Treatment Pain Level (on 0 to 10) scale:   0  / 10     ASSESSMENT  Assessment/Changes in Function:     Good tolerance to therex today with fatigue noted during RC and scapular exercises. []  See Progress Note/Recertification   Patient will continue to benefit from skilled PT services to modify and progress therapeutic interventions, address functional mobility deficits, address ROM deficits, address strength deficits, analyze and address soft tissue restrictions, analyze and cue movement patterns, analyze and modify body mechanics/ergonomics and assess and modify postural abnormalities to attain remaining goals. Progress toward goals / Updated goals:    Progressing towards LTG 3.       PLAN  [x]  Upgrade activities as tolerated YES Continue plan of care   []  Discharge due to :    []  Other:      Therapist: Brianna Amaral DPT    Date: 12/21/2021 Time: 11:10 AM     Future Appointments   Date Time Provider Carlos Mustafa   12/22/2021 10:15 AM Kristene Severe, PT [facility]   12/29/2021 10:15 AM Kristene Severe, PT [facility]   12/30/2021 10:15 AM Kristene Severe, PT [facility]

## 2021-12-22 ENCOUNTER — HOSPITAL ENCOUNTER (OUTPATIENT)
Dept: PHYSICAL THERAPY | Age: 51
Discharge: HOME | End: 2021-12-22
Payer: OTHER GOVERNMENT

## 2021-12-22 PROCEDURE — 97110 THERAPEUTIC EXERCISES: CPT

## 2021-12-22 PROCEDURE — 97530 THERAPEUTIC ACTIVITIES: CPT

## 2021-12-22 PROCEDURE — 97140 MANUAL THERAPY 1/> REGIONS: CPT

## 2021-12-22 NOTE — PROGRESS NOTES
[address] [facility] PHYSICAL THERAPY AT [address] [place] [address]  [address], [address] ,[address], [address]  Phone: [phone]  Fax: [phone]  PROGRESS NOTE  Patient Name: Lei Chris : 1970   Treatment/Medical Diagnosis: Pain in left shoulder [M25.512]   Referral Source: William Donahue MD     Date of Initial Visit: 11/10/2021 Attended Visits: 12 Missed Visits: 2     SUMMARY OF TREATMENT  Gentle stretching of L shoulder and capsule, PROM-AAROM and initiation of AROM to tolerance, manual therapy to improve mobility  CURRENT STATUS  Ms. Ines Bryant has been seen for 11 follow up visits following a L shoulder dislocation and avulsion fracture on 2021. She has made significant progress with ROM although is showing some capsular limitations and discomfort at end ranges. RC and scapular strength is improving and she has tolerated initiation of resistance and weighted exercises well. 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 Recently, pt has been reporting posterior shoulder pain/tenderness; she has been educated to perform resisted exercises no greater than 3 days a week to avoid tendinitis or other overuse injuries to the shoulder with PT in combination of full time work as a nurse. See below for objective measures:     Goal/Measure of Progress Goal Met? 1. Patient will report decreased pain levels to 0/10 in order to sleep through the night pain-luis a   Status at last Eval: 0/10 at best  2-3/10 at worst  Current Status: 0/10 at best  Discomfort at worst yes   2. Demonstrate improved L shoulder PROM to full and pain-free in order to improve dressing ability   Status at last Eval: 139 deg flexion  88 deg abd  60 deg ER Current Status: 156 deg flexion  105 deg abd  63 deg ER Progressing   3. Improve FOTO score from 51 to > or = 70 in order to indicate improved ease with ADLs.     Status at last Eval: 51 Current Status: 61 Progressing     New Goals to be achieved in __3__  weeks:  1. Patient will be independent and compliant with progressive HEP for L shoulder stability/strength in order to maintain gains made with physical therapy   2. Pt to demonstrate full pain-free AROM in order to tolerate return to recreational tennis   3. Demonstrate ability to perform 10 ball tosses without return of symptoms in order to improve pt ability to return to tennis.    4.  Demonstrate improved L shoulder strength to 4+/5 to improve patients ability to return to recreational activity       RECOMMENDATIONS  Continue with skilled PT services 2x/week for 3 weeks to meet above goals and facilitate safe return to recreational activity. Thank you! If you have any questions/comments please contact us directly at [phone]. Thank you for allowing us to assist in the care of your patient. Therapist Signature: Paola Guzman DPT Date: 12/22/2021     Time: 10:28 AM   NOTE TO PHYSICIAN:  PLEASE COMPLETE THE ORDERS BELOW AND FAX TO   [facility]: [phone]. If you are unable to process this request in 24 hours please contact our office: [phone].    ___ I have read the above report and request that my patient continue as recommended.   ___ I have read the above report and request that my patient continue therapy with the following changes/special instructions:_________________________________________________________   ___ I have read the above report and request that my patient be discharged from therapy.      Physician Signature:        Date:       Time:

## 2021-12-22 NOTE — PROGRESS NOTES
PHYSICAL THERAPY - DAILY TREATMENT NOTE    Patient Name: Rosa Vee        Date: 2021  : 1970   YES Patient  Verified  Visit #:   12   of   15  Insurance: Payor:  / Plan: Mychal Langston 74 / Product Type:  /      In time: 6556 Out time: 1120   Total Treatment Time: 55     BCBS/Medicare Time Tracking (below)   Total Timed Codes (min):  NA 1:1 Treatment Time:  NA     TREATMENT AREA =  Pain in left shoulder [M25.512]    SUBJECTIVE  Pain Level (on 0 to 10 scale):  0  / 10   Medication Changes/New allergies or changes in medical history, any new surgeries or procedures? NO    If yes, update Summary List   Subjective Functional Status/Changes:  []  No changes reported     I am frustrated im not 100%          Modalities Rationale:     decrease edema, decrease inflammation and decrease pain to improve patient's ability to perform pain-free ADLs.     min [] Estim, type/location:                                      []  att     []  unatt     []  w/US     []  w/ice    []  w/heat    min []  Mechanical Traction: type/lbs                   []  pro   []  sup   []  int   []  cont    []  before manual    []  after manual    min []  Ultrasound, settings/location:      min []  Iontophoresis w/ dexamethasone, location:                                               []  take home patch       []  in clinic   10 min [x]  Ice     []  Heat    location/position: L shoulder supine    min []  Vasopneumatic Device, press/temp:    If using vaso (only need to measure limb vaso being performed on)      pre-treatment girth :       post-treatment girth :       measured at (landmark location) :      min []  Other:    [x] Skin assessment post-treatment (if applicable):    [x]  intact    [x]  redness- no adverse reaction                  []redness  adverse reaction:        20 min Therapeutic Exercise:  [x]  See flow sheet   Rationale:      increase ROM, increase strength and improve coordination to improve the patients ability to perform unlimited ADLs. 10 min Manual Therapy: PROM into flexion, ER and abd with and without scapular stabilization; grade 1 posterior/inferior mobs of GHJ   Rationale:      decrease pain, increase ROM, increase tissue extensibility and decrease edema  to improve patient's ability to improve PROM to full  The manual therapy interventions were performed at a separate and distinct time from the therapeutic activities interventions. 15 min Therapeutic Activity: [x]  See flow sheet   Rationale:    increase ROM and increase strength to improve the patients ability to resume OH activities    Billed With/As:   [x] TE   [] TA   [] Neuro   [] Self Care Patient Education: [x] Review HEP    [] Progressed/Changed HEP based on:   [] positioning   [] body mechanics   [] transfers   [] heat/ice application    [] other:      Other Objective/Functional Measures:    See PN to MD    Added strap IR stretch     Post Treatment Pain Level (on 0 to 10) scale:   0  / 10     ASSESSMENT  Assessment/Changes in Function:     See PN to MD     [x]  See Progress Note/Recertification   Patient will continue to benefit from skilled PT services to modify and progress therapeutic interventions, address functional mobility deficits, address ROM deficits, address strength deficits, analyze and address soft tissue restrictions, analyze and cue movement patterns, analyze and modify body mechanics/ergonomics and assess and modify postural abnormalities to attain remaining goals. Progress toward goals / Updated goals:    See PN for progress towards goals.       PLAN  [x]  Upgrade activities as tolerated YES Continue plan of care   []  Discharge due to :    []  Other:      Therapist: Maame Stephens DPT    Date: 12/22/2021 Time: 10:47 AM     Future Appointments   Date Time Provider Carlos Mustafa   12/29/2021 10:15 AM Vida Habermann, PT [facility]   12/30/2021 10:15 AM [facility]

## 2021-12-23 ENCOUNTER — APPOINTMENT (OUTPATIENT)
Dept: PHYSICAL THERAPY | Age: 51
End: 2021-12-23
Payer: OTHER GOVERNMENT

## 2021-12-29 ENCOUNTER — HOSPITAL ENCOUNTER (OUTPATIENT)
Dept: PHYSICAL THERAPY | Age: 51
Discharge: HOME | End: 2021-12-29
Payer: OTHER GOVERNMENT

## 2021-12-29 PROCEDURE — 97140 MANUAL THERAPY 1/> REGIONS: CPT

## 2021-12-29 PROCEDURE — 97530 THERAPEUTIC ACTIVITIES: CPT

## 2021-12-29 PROCEDURE — 97110 THERAPEUTIC EXERCISES: CPT

## 2021-12-29 NOTE — PROGRESS NOTES
PHYSICAL THERAPY - DAILY TREATMENT NOTE    Patient Name: Larissa Caputo        Date: 2021  : 1970   YES Patient  Verified  Visit #:   15      20  Insurance: Payor:  / Plan: Mychal Langston 74 / Product Type:  /      In time: 1010 Out time: 2998   Total Treatment Time: 60     BCBS/Medicare Time Tracking (below)   Total Timed Codes (min):  NA 1:1 Treatment Time:  NA     TREATMENT AREA =  Pain in left shoulder [M25.512]    SUBJECTIVE  Pain Level (on 0 to 10 scale):  0  / 10   Medication Changes/New allergies or changes in medical history, any new surgeries or procedures? NO    If yes, update Summary List   Subjective Functional Status/Changes:  []  No changes reported     I am making progress with range and having an easier time reaching the IV bags. Modalities Rationale:     decrease edema, decrease inflammation, decrease pain and increase tissue extensibility to improve patient's ability to perform pain-free ADLs.     min [] Estim, type/location:                                      []  att     []  unatt     []  w/US     []  w/ice    []  w/heat    min []  Mechanical Traction: type/lbs                   []  pro   []  sup   []  int   []  cont    []  before manual    []  after manual    min []  Ultrasound, settings/location:      min []  Iontophoresis w/ dexamethasone, location:                                               []  take home patch       []  in clinic   10 min [x]  Ice     []  Heat    location/position: L shoulder supine    min []  Vasopneumatic Device, press/temp:    If using vaso (only need to measure limb vaso being performed on)      pre-treatment girth :       post-treatment girth :       measured at (landmark location) :      min []  Other:    [x] Skin assessment post-treatment (if applicable):    [x]  intact    [x]  redness- no adverse reaction                  []redness  adverse reaction:        20 min Therapeutic Exercise:  [x]  See flow sheet Rationale:      increase ROM, increase strength, improve coordination and increase proprioception to improve the patients ability to perform unlimited ADLs. 10 min Manual Therapy: PROM into flexion, ER and abd with and without scapular stabilization; grade 4 posterior/inferior mobs of GHJ   Rationale:      decrease pain, increase ROM, increase tissue extensibility and decrease edema  to improve patient's ability to resume OH activity  The manual therapy interventions were performed at a separate and distinct time from the therapeutic activities interventions. 20 min Therapeutic Activity: [x]  See flow sheet  Practiced ball toss for tennis serve in scaption   Rationale:    increase ROM, increase strength, improve coordination and improve balance to improve the patients ability to resume tennis    Billed With/As:   [x] TE   [] TA   [] Neuro   [] Self Care Patient Education: [x] Review HEP    [] Progressed/Changed HEP based on:   [] positioning   [] body mechanics   [] transfers   [] heat/ice application    [] other:      Other Objective/Functional Measures: Added ball toss, body blade IR/ER and bicep curl     Post Treatment Pain Level (on 0 to 10) scale:   0  / 10     ASSESSMENT  Assessment/Changes in Function:     Good tolerance to new exercises and pt is demonstrating improvement with endurance of scapular and RC musculature. Good demonstration of ball toss with cues to ball placement, scapular placement and avoiding UT compensations. []  See Progress Note/Recertification   Patient will continue to benefit from skilled PT services to modify and progress therapeutic interventions, address functional mobility deficits, address ROM deficits, address strength deficits, analyze and address soft tissue restrictions, analyze and cue movement patterns, analyze and modify body mechanics/ergonomics and assess and modify postural abnormalities to attain remaining goals.    Progress toward goals / Updated goals:    Progressing towards LTG 3.       PLAN  [x]  Upgrade activities as tolerated YES Continue plan of care   []  Discharge due to :    []  Other:      Therapist: Aminata Rocha DPT    Date: 12/29/2021 Time: 1:32 PM     Future Appointments   Date Time Provider Carlos Mustafa   12/30/2021 10:15 AM Diego Landon PT [place] [facility]

## 2021-12-30 ENCOUNTER — HOSPITAL ENCOUNTER (OUTPATIENT)
Dept: PHYSICAL THERAPY | Age: 51
Discharge: HOME | End: 2021-12-30
Payer: OTHER GOVERNMENT

## 2021-12-30 PROCEDURE — 97110 THERAPEUTIC EXERCISES: CPT

## 2021-12-30 PROCEDURE — 97530 THERAPEUTIC ACTIVITIES: CPT

## 2021-12-30 PROCEDURE — 97140 MANUAL THERAPY 1/> REGIONS: CPT

## 2021-12-30 NOTE — PROGRESS NOTES
PHYSICAL THERAPY - DAILY TREATMENT NOTE    Patient Name: Sol Person        Date: 2021  : 1970   YES Patient  Verified  Visit #:   15   of   16  Insurance: Payor:  / Plan: Tisha Forrest / Product Type:  /      In time:  Out time:    Total Treatment Time: 55     BCBS/Medicare Time Tracking (below)   Total Timed Codes (min):  NA 1:1 Treatment Time:  Na     TREATMENT AREA =  Pain in left shoulder [M25.512]    SUBJECTIVE  Pain Level (on 0 to 10 scale):  0  / 10   Medication Changes/New allergies or changes in medical history, any new surgeries or procedures? NO    If yes, update Summary List   Subjective Functional Status/Changes:  []  No changes reported     I am playing tennis for the first time on Wednesday of next week. Modalities Rationale:     decrease edema, decrease inflammation and decrease pain to improve patient's ability to perform pain-free ADLs.     min [] Estim, type/location:                                      []  att     []  unatt     []  w/US     []  w/ice    []  w/heat    min []  Mechanical Traction: type/lbs                   []  pro   []  sup   []  int   []  cont    []  before manual    []  after manual    min []  Ultrasound, settings/location:      min []  Iontophoresis w/ dexamethasone, location:                                               []  take home patch       []  in clinic   10 min [x]  Ice     []  Heat    location/position: L shoulder supine     min []  Vasopneumatic Device, press/temp:    If using vaso (only need to measure limb vaso being performed on)      pre-treatment girth :       post-treatment girth :       measured at (landmark location) :      min []  Other:    [x] Skin assessment post-treatment (if applicable):    [x]  intact    [x]  redness- no adverse reaction                  []redness  adverse reaction:        15 min Therapeutic Exercise:  [x]  See flow sheet   Rationale:      increase ROM, increase strength and improve coordination to improve the patients ability to perform unlimited ADLs. 10 min Manual Therapy: PROM into flexion, ER and abd with and without scapular stabilization; grade 4 posterior/inferior mobs of GHJ   Rationale:      decrease pain, increase ROM, increase tissue extensibility and decrease edema  to improve patient's ability to improve PROM to full  The manual therapy interventions were performed at a separate and distinct time from the therapeutic activities interventions. 20 min Therapeutic Activity: [x]  See flow sheet  Practiced ball toss for accuracy of serve and  swing at varying degrees of speed   Rationale:    increase ROM, increase strength, improve coordination, improve balance and increase proprioception to improve the patients ability to resume recreational tennis    Billed With/As:   [x] TE   [] TA   [] Neuro   [] Self Care Patient Education: [x] Review HEP    [] Progressed/Changed HEP based on:   [] positioning   [] body mechanics   [] transfers   [] heat/ice application    [] other:      Other Objective/Functional Measures: Added PNF D2 flexion with YTB     Post Treatment Pain Level (on 0 to 10) scale:   0  / 10     ASSESSMENT  Assessment/Changes in Function:     Good demonstration of ball toss with approx 140 deg shoulder flexion occurring; mild to no UT compensations noted and no reports of pain. Pt is able to perform backhand swing with appropriate scapular mobility and no capsular restrictions noted during follow through. []  See Progress Note/Recertification   Patient will continue to benefit from skilled PT services to modify and progress therapeutic interventions, address functional mobility deficits, address ROM deficits, address strength deficits, analyze and address soft tissue restrictions, analyze and cue movement patterns, analyze and modify body mechanics/ergonomics and assess and modify postural abnormalities to attain remaining goals. Progress toward goals / Updated goals:    Progressing towards LTG 4; met LTG 3. PLAN  [x]  Upgrade activities as tolerated YES Continue plan of care   []  Discharge due to :    []  Other:      Therapist: Ladonna Kern DPT    Date: 12/30/2021 Time: 10:31 AM     No future appointments.

## 2022-01-11 ENCOUNTER — HOSPITAL ENCOUNTER (OUTPATIENT)
Dept: PHYSICAL THERAPY | Age: 52
Discharge: HOME | End: 2022-01-11
Payer: OTHER GOVERNMENT

## 2022-01-11 PROCEDURE — 97530 THERAPEUTIC ACTIVITIES: CPT

## 2022-01-11 PROCEDURE — 97140 MANUAL THERAPY 1/> REGIONS: CPT

## 2022-01-11 PROCEDURE — 97110 THERAPEUTIC EXERCISES: CPT

## 2022-01-11 NOTE — PROGRESS NOTES
[id number] [facility] PHYSICAL THERAPY AT [address] [place] [address]  Daniel Doyle \[facility]\""[address], [address] ,[address], [address]  Phone: [phone]  Fax: [phone]  PROGRESS NOTE  Patient Name: Queen Nicolle : 1970   Treatment/Medical Diagnosis: Pain in left shoulder [M25.512]   Referral Source: Adán Landeros MD     Date of Initial Visit: 11/10/2021 Attended Visits: 15 Missed Visits: 2     SUMMARY OF TREATMENT  Gentle stretching of L shoulder and capsule, strengthening of scapular stabilizers and RC musculature, sport specific training, manual therapy to improve mobility  Ciro Marques  Mrs. Silverio Sanders has been seen for 14 follow up visits following L shoulder dislocation and avulsion fracture on 2021. She continues to show steady improvements in ROM although is demonstrating capsular restrictions and end range pain into flexion and ER. Pt strength is steadily improving and she is able to perform approx 100 deg scaption prior to UT compensation. She has returned to tennis and reports only mild soreness of L shoulder following ball tosses for serving. Pt main difficulty is early onset of fatigue as she is continuing to increase endurance of RC and scapular musculature. See below for objective measures:    Goal/Measure of Progress Goal Met? 1. Patient will be independent and compliant with progressive HEP for L shoulder stability/strength in order to maintain gains made with physical therapy   Status at last Eval: established Current Status: Independent with current program yes   2. Pt to demonstrate full pain-free AROM in order to tolerate return to recreational tennis   Status at last Eval: 156 deg flexion  105 deg abd  63 deg ER @ 45 deg Abd Current Status: 162 deg flexion  110 deg abd  48 deg ER in neutral Progressing   3.   Demonstrate ability to perform 10 ball tosses without return of symptoms in order to improve pt ability to return to tennis.    Status at last Eval: - Current Status: Able to perform 10 ball tosses without symptoms but demonstrated significant UT compensation above 90 deg Progressing   4. Demonstrate improved L shoulder strength to 4+/5 to improve patients ability to return to recreational activity   Status at last Eval: MMT deferred Current Status: Flexion 4/5  Abd 4+/5 with mild pain  ER 4/5  MT/rhomboids 4-/5 Progressing     New Goals to be achieved in __2-3__  weeks:  1. Patient will be independent and compliant with progressive HEP for L shoulder stability/strength in order to maintain gains made with physical therapy   2. Pt to demonstrate full pain-free AROM in order to tolerate return to recreational tennis   3. Improve FOTO score from 51 to > or = 70 in order to indicate improved ease with ADLs.     4.  Demonstrate ability to perform 10 ball tosses without return of symptoms in order to improve pt ability to return to tennis.        RECOMMENDATIONS  Continue with PT services for 2-3 weeks to continue improving ROM and ensure appropriate/safe return to recreational tennis. Thank you! If you have any questions/comments please contact us directly at [phone]. Thank you for allowing us to assist in the care of your patient. Therapist Signature: Oly Funes DPT Date: 1/11/2022     Time: 4:14 PM   NOTE TO PHYSICIAN:  PLEASE COMPLETE THE ORDERS BELOW AND FAX TO   [facility]: [phone]. If you are unable to process this request in 24 hours please contact our office: [phone].    ___ I have read the above report and request that my patient continue as recommended.   ___ I have read the above report and request that my patient continue therapy with the following changes/special instructions:_________________________________________________________   ___ I have read the above report and request that my patient be discharged from therapy.      Physician Signature:        Date:       Time:

## 2022-01-11 NOTE — PROGRESS NOTES
PHYSICAL THERAPY - DAILY TREATMENT NOTE    Patient Name: Gabino Liu        Date: 2022  : 1970   YES Patient  Verified  Visit #:      20  Insurance: Payor: [employer] / Plan: [employer] / Product Type:  /      In time: 215 Out time: 320   Total Treatment Time: 60     BCBS/Medicare Time Tracking (below)   Total Timed Codes (min):  NA 1:1 Treatment Time:  NA     TREATMENT AREA =  Pain in left shoulder [M25.512]    SUBJECTIVE  Pain Level (on 0 to 10 scale):  0  / 10   Medication Changes/New allergies or changes in medical history, any new surgeries or procedures? NO    If yes, update Summary List   Subjective Functional Status/Changes:  []  No changes reported     I felt great at tennis, I had some stiffness from the ball toss but nothing from the backhand. My range is my last remaining problem. Modalities Rationale:     decrease edema, decrease inflammation and decrease pain to improve patient's ability to perform pain-free ADLs.     min [] Estim, type/location:                                      []  att     []  unatt     []  w/US     []  w/ice    []  w/heat    min []  Mechanical Traction: type/lbs                   []  pro   []  sup   []  int   []  cont    []  before manual    []  after manual    min []  Ultrasound, settings/location:      min []  Iontophoresis w/ dexamethasone, location:                                               []  take home patch       []  in clinic   10 min [x]  Ice     []  Heat    location/position: L shoulder supine    min []  Vasopneumatic Device, press/temp:    If using vaso (only need to measure limb vaso being performed on)      pre-treatment girth :       post-treatment girth :       measured at (landmark location) :      min []  Other:    [x] Skin assessment post-treatment (if applicable):    [x]  intact    [x]  redness- no adverse reaction                  []redness  adverse reaction:        15 min Therapeutic Exercise:  [x] See flow sheet   Rationale:      increase ROM, increase strength, improve coordination and increase proprioception to improve the patients ability to perform unlimited ADLs. 15 min Manual Therapy: PROM into flexion, ER and abd with and without scapular stabilization; grade 4 posterior/inferior mobs of GHJ   Rationale:      decrease pain, increase ROM, increase tissue extensibility and decrease edema  to improve patient's ability to resume recreational activity  The manual therapy interventions were performed at a separate and distinct time from the therapeutic activities interventions. 15 min Therapeutic Activity: [x]  See flow sheet   Rationale:    increase ROM, increase strength and improve coordination to improve the patients ability to improve dressing and grooming abilties      Billed With/As:   [x] TE   [] TA   [] Neuro   [] Self Care Patient Education: [x] Review HEP    [] Progressed/Changed HEP based on:   [] positioning   [] body mechanics   [] transfers   [] heat/ice application    [] other:      Other Objective/Functional Measures:    See PN     Post Treatment Pain Level (on 0 to 10) scale:   0  / 10     ASSESSMENT  Assessment/Changes in Function:     See PN     []  See Progress Note/Recertification   Patient will continue to benefit from skilled PT services to modify and progress therapeutic interventions, address functional mobility deficits, address ROM deficits, address strength deficits, analyze and address soft tissue restrictions, analyze and cue movement patterns, analyze and modify body mechanics/ergonomics and assess and modify postural abnormalities to attain remaining goals. Progress toward goals / Updated goals:    See PN for progress towards goals.       PLAN  [x]  Upgrade activities as tolerated YES Continue plan of care   []  Discharge due to :    []  Other:      Therapist: Aranza Mccain DPT    Date: 1/11/2022 Time: 2:34 PM     Future Appointments   Date Time Provider Carlos Mustafa   1/19/2022  8:00 AM Keith Kang PT [place] [facility]

## 2022-01-19 ENCOUNTER — HOSPITAL ENCOUNTER (OUTPATIENT)
Dept: PHYSICAL THERAPY | Age: 52
Discharge: HOME | End: 2022-01-19
Payer: OTHER GOVERNMENT

## 2022-01-19 PROCEDURE — 97110 THERAPEUTIC EXERCISES: CPT

## 2022-01-19 PROCEDURE — 97140 MANUAL THERAPY 1/> REGIONS: CPT

## 2022-01-19 PROCEDURE — 97530 THERAPEUTIC ACTIVITIES: CPT

## 2022-01-19 NOTE — PROGRESS NOTES
PHYSICAL THERAPY - DAILY TREATMENT NOTE    Patient Name: Sylvia Gooden        Date: 2022  : 1970   YES Patient  Verified  Visit #:     Insurance: Payor: MARC / Plan: Mychal Langston 74 / Product Type:  /      In time: 800 Out time: 905   Total Treatment Time: 60     BCBS/Medicare Time Tracking (below)   Total Timed Codes (min):  NA 1:1 Treatment Time:  NA     TREATMENT AREA =  Pain in left shoulder [M25.512]    SUBJECTIVE  Pain Level (on 0 to 10 scale):  0  / 10   Medication Changes/New allergies or changes in medical history, any new surgeries or procedures? NO    If yes, update Summary List   Subjective Functional Status/Changes:  []  No changes reported     I had covid so I havent been able to play tennis, feeling very stiff. .           Modalities Rationale:     decrease edema, decrease inflammation and decrease pain to improve patient's ability to perform pain-free ADLs.     min [] Estim, type/location:                                      []  att     []  unatt     []  w/US     []  w/ice    []  w/heat    min []  Mechanical Traction: type/lbs                   []  pro   []  sup   []  int   []  cont    []  before manual    []  after manual    min []  Ultrasound, settings/location:      min []  Iontophoresis w/ dexamethasone, location:                                               []  take home patch       []  in clinic   10 min [x]  Ice     []  Heat    location/position: L shoulder supine    min []  Vasopneumatic Device, press/temp:    If using vaso (only need to measure limb vaso being performed on)      pre-treatment girth :       post-treatment girth :       measured at (landmark location) :      min []  Other:    [x] Skin assessment post-treatment (if applicable):    [x]  intact    [x]  redness- no adverse reaction                  []redness  adverse reaction:        15 min Therapeutic Exercise:  [x]  See flow sheet   Rationale:      increase ROM, increase strength, improve coordination and increase proprioception to improve the patients ability to perform unlimited ADLs. 15 min Manual Therapy: PROM into flexion, ER and abd with and without scapular stabilization; grade 4 posterior/inferior mobs of GHJ   Rationale:      decrease pain, increase ROM, increase tissue extensibility and decrease edema  to improve patient's ability to resume recreational activity  The manual therapy interventions were performed at a separate and distinct time from the therapeutic activities interventions. 15 min Therapeutic Activity: [x]  See flow sheet   Rationale:    increase ROM, increase strength and improve coordination to improve the patients ability to improve dressing and grooming abilties      Billed With/As:   [x] TE   [] TA   [] Neuro   [] Self Care Patient Education: [x] Review HEP    [] Progressed/Changed HEP based on:   [] positioning   [] body mechanics   [] transfers   [] heat/ice application    [] other:      Other Objective/Functional Measures: Added prone on SB Y and T     Post Treatment Pain Level (on 0 to 10) scale:   0  / 10     ASSESSMENT  Assessment/Changes in Function:     Continues to show capsular restricitons and significant ERP into flexion and ER. Pt is seeing MD next week and was encouraged to discuss potential cortisone injection to continue progressing with ROM. []  See Progress Note/Recertification   Patient will continue to benefit from skilled PT services to modify and progress therapeutic interventions, address functional mobility deficits, address ROM deficits, address strength deficits, analyze and address soft tissue restrictions, analyze and cue movement patterns, analyze and modify body mechanics/ergonomics and assess and modify postural abnormalities to attain remaining goals. Progress toward goals / Updated goals:    Progressing towards LTG 2.       PLAN  [x]  Upgrade activities as tolerated YES Continue plan of care   [] Discharge due to :    []  Other:      Therapist: Chey Xiong DPT    Date: 1/19/2022 Time: 2:34 PM     Future Appointments   Date Time Provider Carlos Mustafa   1/26/2022  9:30 AM Layne Treviño PT [facility]

## 2022-01-26 ENCOUNTER — HOSPITAL ENCOUNTER (OUTPATIENT)
Dept: PHYSICAL THERAPY | Age: 52
Discharge: HOME | End: 2022-01-26
Payer: OTHER GOVERNMENT

## 2022-01-26 PROCEDURE — 97110 THERAPEUTIC EXERCISES: CPT

## 2022-01-26 PROCEDURE — 97530 THERAPEUTIC ACTIVITIES: CPT

## 2022-01-26 PROCEDURE — 97140 MANUAL THERAPY 1/> REGIONS: CPT

## 2022-01-26 NOTE — PROGRESS NOTES
PHYSICAL THERAPY - DAILY TREATMENT NOTE    Patient Name: Roxanne Renteria        Date: 2022  : 1970   YES Patient  Verified  Visit #:     Insurance: Payor:  / Plan: Calvin Presser / Product Type:  /      In time: 684 Out time: 3355   Total Treatment Time: 60     BCBS/Medicare Time Tracking (below)   Total Timed Codes (min):  NA 1:1 Treatment Time:  NA     TREATMENT AREA =  Pain in left shoulder [M25.512]    SUBJECTIVE  Pain Level (on 0 to 10 scale):  0  / 10   Medication Changes/New allergies or changes in medical history, any new surgeries or procedures? NO    If yes, update Summary List   Subjective Functional Status/Changes:  []  No changes reported     I have trouble holding my hair dryer behind my head and reaching out of the window to get a drink at [employer] [address] through. Modalities Rationale:     decrease edema, decrease inflammation and decrease pain to improve patient's ability to perform pain-free ADLs.     min [] Estim, type/location:                                      []  att     []  unatt     []  w/US     []  w/ice    []  w/heat    min []  Mechanical Traction: type/lbs                   []  pro   []  sup   []  int   []  cont    []  before manual    []  after manual    min []  Ultrasound, settings/location:      min []  Iontophoresis w/ dexamethasone, location:                                               []  take home patch       []  in clinic   10 min [x]  Ice     []  Heat    location/position: L shoulder supine    min []  Vasopneumatic Device, press/temp:    If using vaso (only need to measure limb vaso being performed on)      pre-treatment girth :       post-treatment girth :       measured at (landmark location) :      min []  Other:    [x] Skin assessment post-treatment (if applicable):    [x]  intact    [x]  redness- no adverse reaction                  []redness  adverse reaction:        20 min Therapeutic Exercise:  [x]  See flow sheet   Rationale:      increase ROM, increase strength, improve coordination and increase proprioception to improve the patients ability to perform unlimited ADLs. 10 min Manual Therapy: PROM into flexion, ER and abd with and without scapular stabilization; grade 4 posterior/inferior mobs of GHJ   Rationale:      decrease pain, increase ROM, increase tissue extensibility and decrease edema  to improve patient's ability to improve OH motion tolerance. The manual therapy interventions were performed at a separate and distinct time from the therapeutic activities interventions. 20 min Therapeutic Activity: [x]  See flow sheet   Rationale:    increase ROM, increase strength, improve coordination and increase proprioception to improve the patients ability to return to unlimited tennis     Billed With/As:   [x] TE   [] TA   [] Neuro   [] Self Care Patient Education: [x] Review HEP    [] Progressed/Changed HEP based on:   [] positioning   [] body mechanics   [] transfers   [] heat/ice application    [] other:      Other Objective/Functional Measures: Added prone on elbow weight shift for posterior capsule, snow artei at wall and scaption at wall     Post Treatment Pain Level (on 0 to 10) scale:   0  / 10     ASSESSMENT  Assessment/Changes in Function:     Emphasis of today's treatment was improving scapulohumeral rhythm with OH motion and prevent UT compensation that pt prefers. HEP was updated and pt was encouraged to avoid exacerbating end range positions in order to maintain low levels of pain.       []  See Progress Note/Recertification   Patient will continue to benefit from skilled PT services to modify and progress therapeutic interventions, address functional mobility deficits, address ROM deficits, address strength deficits, analyze and address soft tissue restrictions, analyze and cue movement patterns, analyze and modify body mechanics/ergonomics and assess and modify postural abnormalities to attain remaining goals. Progress toward goals / Updated goals:    Progressing towards LTG 2. PLAN  [x]  Upgrade activities as tolerated YES Continue plan of care   []  Discharge due to :    []  Other:      Therapist: Scott Suazo DPT    Date: 1/26/2022 Time: 9:34 AM     No future appointments.

## 2022-02-02 ENCOUNTER — APPOINTMENT (OUTPATIENT)
Dept: PHYSICAL THERAPY | Age: 52
End: 2022-02-02
Payer: OTHER GOVERNMENT

## 2022-02-03 ENCOUNTER — HOSPITAL ENCOUNTER (OUTPATIENT)
Dept: PHYSICAL THERAPY | Age: 52
Discharge: HOME | End: 2022-02-03
Payer: OTHER GOVERNMENT

## 2022-02-03 PROCEDURE — 97110 THERAPEUTIC EXERCISES: CPT

## 2022-02-03 PROCEDURE — 97530 THERAPEUTIC ACTIVITIES: CPT

## 2022-02-03 PROCEDURE — 97140 MANUAL THERAPY 1/> REGIONS: CPT

## 2022-02-03 NOTE — PROGRESS NOTES
PHYSICAL THERAPY - DAILY TREATMENT NOTE    Patient Name: Raquel Purchase        Date: 2/3/2022  : 1970   YES Patient  Verified  Visit #:     Insurance: Payor: MARC / Plan: Andrew Lang / Product Type:  /      In time: 210 Out time: 315   Total Treatment Time: 60     BCBS/Medicare Time Tracking (below)   Total Timed Codes (min):  NA 1:1 Treatment Time:  NA     TREATMENT AREA =  Pain in left shoulder [M25.512]    SUBJECTIVE  Pain Level (on 0 to 10 scale):  0  / 10   Medication Changes/New allergies or changes in medical history, any new surgeries or procedures? NO    If yes, update Summary List   Subjective Functional Status/Changes:  []  No changes reported     I still feel that my L shoulder \"isnt right\" and aches more than it should. Modalities Rationale:     decrease edema, decrease inflammation and decrease pain to improve patient's ability to perform pain-free ADLs.     min [] Estim, type/location:                                      []  att     []  unatt     []  w/US     []  w/ice    []  w/heat    min []  Mechanical Traction: type/lbs                   []  pro   []  sup   []  int   []  cont    []  before manual    []  after manual    min []  Ultrasound, settings/location:      min []  Iontophoresis w/ dexamethasone, location:                                               []  take home patch       []  in clinic   10 min [x]  Ice     []  Heat    location/position: L shoulder supine    min []  Vasopneumatic Device, press/temp:    If using vaso (only need to measure limb vaso being performed on)      pre-treatment girth :       post-treatment girth :       measured at (landmark location) :      min []  Other:    [x] Skin assessment post-treatment (if applicable):    [x]  intact    [x]  redness- no adverse reaction                  []redness  adverse reaction:        20 min Therapeutic Exercise:  [x]  See flow sheet   Rationale:      increase ROM, increase strength, improve coordination and increase proprioception to improve the patients ability to perform unlimited ADLs. 10 min Manual Therapy: PROM into flexion, ER and abd with and without scapular stabilization; grade 4 posterior/inferior mobs of GHJ   Rationale:      decrease pain, increase ROM, increase tissue extensibility and decrease edema  to improve patient's ability to resume OH motion   The manual therapy interventions were performed at a separate and distinct time from the therapeutic activities interventions. 20 min Therapeutic Activity: [x]  See flow sheet   Rationale:    increase ROM, increase strength, improve coordination, improve balance and increase proprioception to improve the patients ability to return to unlimited tennis    Billed With/As:   [x] TE   [] TA   [] Neuro   [] Self Care Patient Education: [x] Review HEP    [] Progressed/Changed HEP based on:   [] positioning   [] body mechanics   [] transfers   [] heat/ice application    [] other:      Other Objective/Functional Measures:    See PN     Post Treatment Pain Level (on 0 to 10) scale:   0  / 10     ASSESSMENT  Assessment/Changes in Function:     See PN     []  See Progress Note/Recertification   Patient will continue to benefit from skilled PT services to modify and progress therapeutic interventions, address functional mobility deficits, address ROM deficits, address strength deficits, analyze and address soft tissue restrictions, analyze and cue movement patterns, analyze and modify body mechanics/ergonomics and assess and modify postural abnormalities to attain remaining goals. Progress toward goals / Updated goals:    See PN for progress towards goals.       PLAN  [x]  Upgrade activities as tolerated YES Continue plan of care   []  Discharge due to :    []  Other:      Therapist: Meenakshi Easley DPT    Date: 2/3/2022 Time: 2:45 PM     Future Appointments   Date Time Provider Carlos Mustafa   2/9/2022  8:45 AM Juanis Helton David Carrillo [facility] [address]

## 2022-02-03 NOTE — PROGRESS NOTES
[address] [facility] PHYSICAL THERAPY AT [address] [place] [address]  Daniel Doyle [facility], [address] ,[address], [address]  Phone: [phone]  Fax: [phone]  PROGRESS NOTE  Patient Name: Elliott  : 1970   Treatment/Medical Diagnosis: Pain in left shoulder [M25.512]   Referral Source: Vale Abernathy MD     Date of Initial Visit: 11/10/2021 Attended Visits: 18 Missed Visits: 2     SUMMARY OF TREATMENT  Gentle stretching of L shoulder and capsule, strengthening of scapular stabilizers and RC musculature, sport specific training, manual therapy to improve mobility  CURRENT STATUS  Ms. Leonel Carmen has been seen for 17 follow up visits following L shoulder dislocation and avulsion fracture on 2021. She continues to show steady improvements in ROM although is demonstrating capsular restrictions and end range pain into flexion and ER. Pt reports shoulder continues to feel \"uncomfortable\" with weighted work related demands and that pain returns only at end range stretches and eccentric movements. She has been educated on proper technique/positioning in order to prevent exacerbations in pain during workday, but is often unsuccessful and reports inc in pain following long days. Pt has new onset of pain during resisted ER motion that has not previously been present; although RC strength continues to improve, reports of pain are occurring more consistently. See below for objective measures:     Goal/Measure of Progress Goal Met? 1. Patient will be independent and compliant with progressive HEP for L shoulder stability/strength in order to maintain gains made with physical therapy   Status at last Eval: Established  Current Status: Independent with current program  yes   2.   Pt to demonstrate full pain-free AROM in order to tolerate return to recreational tennis   Status at last Eval: AAROM:  162 deg flexion  110 deg abd  48 deg ER in neutral Current Status: Able to perform 110 deg flexion prior to UT compensations   Progressing   3. Improve FOTO score from 51 to > or = 70 in order to indicate improved ease with ADLs.     Status at last Eval: 51 Current Status: TBA Progressing   4. Demonstrate ability to perform 10 ball tosses without return of symptoms in order to improve pt ability to return to tennis.    Status at last Eval: Able to perform 10 ball tosses without symptoms but demonstrated significant UT compensation above 90 deg Current Status: imropved mechanics with no reports of pain; reports poor accuracy with tosses yes     New Goals to be achieved in __3-4__  weeks: As above    RECOMMENDATIONS  Pt would benefit from reassessment to rule out additional pathology following humeral fracture. Please advise on plans for further treatment. Thank you! If you have any questions/comments please contact us directly at [phone]. Thank you for allowing us to assist in the care of your patient. Therapist Signature: Ladonna Kern DPT Date: 2/3/2022     Time: 2:51 PM   NOTE TO PHYSICIAN:  PLEASE COMPLETE THE ORDERS BELOW AND FAX TO   [facility]: [phone]. If you are unable to process this request in 24 hours please contact our office: [phone].    ___ I have read the above report and request that my patient continue as recommended.   ___ I have read the above report and request that my patient continue therapy with the following changes/special instructions:_________________________________________________________   ___ I have read the above report and request that my patient be discharged from therapy.      Physician Signature:        Date:       Time:

## 2022-03-30 NOTE — PROGRESS NOTES
[facility] PHYSICAL THERAPY AT [address] [place] [address]  [address], [address] ,[address], [address]  Phone: [phone]  Fax: [phone] SUMMARY  Patient Name: Audi Moore : 1970   Treatment/Medical Diagnosis: Pain in left shoulder [M25.512]   Referral Source: Henry Bautista MD     Date of Initial Visit: 11/10/2021 Attended Visits: 18 Missed Visits: 3     SUMMARY OF TREATMENT  Gentle stretching of L shoulder and capsule, strengthening of scapular stabilizers and RC musculature, sport specific training, manual therapy to improve mobility  CURRENT STATUS  Ms. Demetrius Paul was seen for 17 follow up visits following L shoulder dislocation and avulsion fracture on 2021. Pt made steady gains with ROM and strength but was reporting new onset of pain with resisted ER on final visit. She was most recently seen on 2/3/2022 when most recent progress note was written. She was referred back to MD for testing to rule out additional pathology due to slow progress with PT strengthening/stability exercises. She has not returned to PT since final session on 2/3/2022 and has not verbalized need for further sessions therefore she is being discharged at this time. RECOMMENDATIONS  Other: Discontinue therapy; returned to MD for further testing. If you have any questions/comments please contact us directly at [phone]. Thank you for allowing us to assist in the care of your patient.     Therapist Signature: Destiny Salinas PT Date: 3/30/2022     Time: 10:55 AM

## 2022-05-09 ENCOUNTER — APPOINTMENT (OUTPATIENT)
Dept: PHYSICAL THERAPY | Age: 52
End: 2022-05-09
Payer: OTHER GOVERNMENT

## 2022-05-23 ENCOUNTER — HOSPITAL ENCOUNTER (OUTPATIENT)
Dept: PHYSICAL THERAPY | Age: 52
Discharge: HOME | End: 2022-05-23
Payer: OTHER GOVERNMENT

## 2022-05-23 PROCEDURE — 97110 THERAPEUTIC EXERCISES: CPT

## 2022-05-23 PROCEDURE — 97162 PT EVAL MOD COMPLEX 30 MIN: CPT

## 2022-05-23 NOTE — THERAPY EVALUATION
[address] [facility] PHYSICAL THERAPY  [facility]  [address], [address], [address]  Phone: [phone]  Fax: [phone] / [address] [place] [address] PHYSICAL THERAPY SERVICES  Patient Name: Denise Mccray : 1970   Medical   Diagnosis: Pain in left shoulder [M25.512] Treatment Diagnosis: S/p L labral tear and bicep tenodesis    Onset Date: DOS 2022     Referral Source: Rolando Castellanos MD [facility]): 2022   Prior Hospitalization: See medical history Provider #: [id number]   Prior Level of Function: Tennis 3-4x/week, work as nurse pain-free, ADL's pain-free   Comorbidities: Recent weight loss, H/o hysterectomy, R ankle fracture   Medications: Verified on Patient Summary List   The Plan of Care and following information is based on the information from the initial evaluation.   ===========================================================================================  Assessment / key information:  Patient is a 54 y.o. female who presents to [facility] s/p L labral repair and bicep tenodesis on 2022. Patient was previously seen at this facility for L shoulder pain following a fall that caused an avulsion fracture of L humerus. Pt was referred back to MD after showing plateau in progress with strength gains. Pt opted for surgery in May when she felt only minimal improvements in symptoms following cortisone injection. Since surgery, pt repots low pain levels ranging from 0-1/10. She is taking naproxen as needed for pain and icing frequently. Pt is compliant with sling use but states she has been taking it off while working at desk to type; pt was advised against this and reminded of post-op protocol and healing times. PROM of L shoulder is approx 130 deg flex, 20 deg ER and 40 deg Abd. An initial HEP was provided and donning/doffing and fit of sling and post op protocol was reviewed at length.  Physical Therapy services will be beneficial in order to decrease pain, improve ROM, strength and stability of L shoulder in order to resume recreational activity and return to PLOF.   ===========================================================================================  Eval Complexity: History MEDIUM  Complexity : 1-2 comorbidities / personal factors will impact the outcome/ POC ;  Examination  MEDIUM Complexity : 3 Standardized tests and measures addressing body structure, function, activity limitation and / or participation in recreation ; Presentation MEDIUM Complexity : Evolving with changing characteristics ; Decision Making MEDIUM Complexity : FOTO score of 26-74; Overall Complexity MEDIUM  Problem List: pain affecting function, decrease ROM, decrease strength, edema affecting function, impaired gait/ balance, decrease ADL/ functional abilitiies, decrease activity tolerance, decrease flexibility/ joint mobility and decrease transfer abilities   Treatment Plan may include any combination of the following: Therapeutic exercise, Therapeutic activities, Neuromuscular re-education, Physical agent/modality, Manual therapy, Patient education, Self Care training and Functional mobility training  Patient / Family readiness to learn indicated by: asking questions, trying to perform skills and interest  Persons(s) to be included in education: patient (P)  Barriers to Learning/Limitations: None  Measures taken, if barriers to learning:    Patient Goal (s): \"ROM\"   Patient self reported health status: good  Rehabilitation Potential: good   Short Term Goals: To be accomplished in  4  weeks:  1. Patient will be independent and compliant with initial HEP. 2. Patient will report decreased pain levels to 0/10 in order to sleep through the night pain-free  3. Demonstrate improved L shoulder PROM to full and pain-free in order to safely progress to phase 2 of post-op protocol   Long Term Goals:  To be accomplished in  8 weeks: 1. Patient will be independent and compliant with progressive HEP for L shoulder strength and stability in order to maintain gains made with physical therapy  2. Improve FOTO score by 10 points in order to indicate improved ease with ADLs. 3. Demonstrate improved L shoulder strength to 4/5 to improve patients ability to resume recreational activity. 4. Demonstrate improved L shoulder AROM to full and pain-free in order to eventually return to tennis. Frequency / Duration:   Patient to be seen  2-3  times per week for 4-6  weeks:  Patient / Caregiver education and instruction: self care, activity modification, brace/ splint application and exercises  Therapist Signature: Rogelio Walker DPT Date: 5/01/0169   Certification Period: NA Time: 3:02 PM   ===========================================================================================  I certify that the above Physical Therapy Services are being furnished while the patient is under my care. I agree with the treatment plan and certify that this therapy is necessary. Physician Signature:        Date:       Time:     Please sign and return to [facility] or you may fax the signed copy to [phone]. Thank you.

## 2022-05-24 NOTE — PROGRESS NOTES
PHYSICAL THERAPY - DAILY TREATMENT NOTE    Patient Name: Mg Johnson        Date: 2022  : 1970   YES Patient  Verified  Visit #:     Insurance: Payor:  / Plan: Mychal Langston 74 / Product Type:  /      In time: 550 Out time: 630   Total Treatment Time: 40     BCBS/Medicare Time Tracking (below)   Total Timed Codes (min):  NA 1:1 Treatment Time:  NA     TREATMENT AREA =  Pain in left shoulder [M25.512]    SUBJECTIVE  Pain Level (on 0 to 10 scale):  0-1  / 10   Medication Changes/New allergies or changes in medical history, any new surgeries or procedures? NO    If yes, update Summary List   Subjective Functional Status/Changes:  []  No changes reported     Patient is a 54 y.o. female who presents to [facility] labral repair and bicep tenodesis on 2022          Modalities Rationale:     decrease edema, decrease inflammation and decrease pain to improve patient's ability to perform pain-free ADLs.     min [] Estim, type/location:                                      []  att     []  unatt     []  w/US     []  w/ice    []  w/heat    min []  Mechanical Traction: type/lbs                   []  pro   []  sup   []  int   []  cont    []  before manual    []  after manual    min []  Ultrasound, settings/location:      min []  Iontophoresis w/ dexamethasone, location:                                               []  take home patch       []  in clinic   HEP min []  Ice     []  Heat    location/position:     min []  Vasopneumatic Device, press/temp:    If using vaso (only need to measure limb vaso being performed on)      pre-treatment girth :       post-treatment girth :       measured at (landmark location) :      min []  Other:    [] Skin assessment post-treatment (if applicable):    []  intact    []  redness- no adverse reaction                  []redness  adverse reaction:        15 min Therapeutic Exercise:  [x]  See flow sheet   Rationale: increase ROM, increase strength, improve coordination and increase proprioception to improve the patients ability to perform unlimited ADLs. Billed With/As:   [x] TE   [] TA   [] Neuro   [] Self Care Patient Education: [x] Review HEP    [] Progressed/Changed HEP based on:   [] positioning   [] body mechanics   [] transfers   [] heat/ice application    [] other:      Other Objective/Functional Measures:    See POC     Post Treatment Pain Level (on 0 to 10) scale:   0  / 10     ASSESSMENT  Assessment/Changes in Function:     See POC     []  See Progress Note/Recertification   Patient will continue to benefit from skilled PT services to modify and progress therapeutic interventions, address functional mobility deficits, address ROM deficits, address strength deficits, analyze and address soft tissue restrictions, analyze and cue movement patterns, analyze and modify body mechanics/ergonomics and assess and modify postural abnormalities to attain remaining goals. Progress toward goals / Updated goals:    See POC for new short and long term goals.      PLAN  [x]  Upgrade activities as tolerated YES Continue plan of care   []  Discharge due to :    []  Other:      Therapist: Palmer Pérez DPT    Date: 5/24/2022 Time: 10:38 AM     Future Appointments   Date Time Provider Carlos Mustafa   5/27/2022  2:00 PM Mine Hodge, PT [facility]   5/31/2022  1:30 PM Mine Hodge PT [facility]   6/3/2022 11:45 AM [facility]

## 2022-05-27 ENCOUNTER — HOSPITAL ENCOUNTER (OUTPATIENT)
Dept: PHYSICAL THERAPY | Age: 52
Discharge: HOME | End: 2022-05-27
Payer: OTHER GOVERNMENT

## 2022-05-27 PROCEDURE — 97140 MANUAL THERAPY 1/> REGIONS: CPT

## 2022-05-27 PROCEDURE — 97110 THERAPEUTIC EXERCISES: CPT

## 2022-05-27 NOTE — PROGRESS NOTES
PHYSICAL THERAPY - DAILY TREATMENT NOTE    Patient Name: Harris Ayoub        Date: 2022  : 1970   YES Patient  Verified  Visit #:   2   of   12  Insurance: Payor: MARC / Plan: Mychal Langston 74 / Product Type:  /      In time: 205 Out time: 255   Total Treatment Time: 45     BCBS/Medicare Time Tracking (below)   Total Timed Codes (min):  NA 1:1 Treatment Time:  NA     TREATMENT AREA =  Pain in left shoulder [M25.512]    SUBJECTIVE  Pain Level (on 0 to 10 scale):  0-1  / 10   Medication Changes/New allergies or changes in medical history, any new surgeries or procedures? NO    If yes, update Summary List   Subjective Functional Status/Changes:  []  No changes reported     I have no pain at all its hard to remember not to move it. Modalities Rationale:     decrease edema, decrease inflammation and decrease pain to improve patient's ability to perform pain-free ADLs.     min [] Estim, type/location:                                      []  att     []  unatt     []  w/US     []  w/ice    []  w/heat    min []  Mechanical Traction: type/lbs                   []  pro   []  sup   []  int   []  cont    []  before manual    []  after manual    min []  Ultrasound, settings/location:      min []  Iontophoresis w/ dexamethasone, location:                                               []  take home patch       []  in clinic   10 min [x]  Ice     []  Heat    location/position: L shoulder supine with pillow support    min []  Vasopneumatic Device, press/temp:    If using vaso (only need to measure limb vaso being performed on)      pre-treatment girth :       post-treatment girth :       measured at (landmark location) :      min []  Other:    [x] Skin assessment post-treatment (if applicable):    [x]  intact    [x]  redness- no adverse reaction                  []redness  adverse reaction:        15 min Therapeutic Exercise:  [x]  See flow sheet   Rationale:      increase ROM, increase strength, improve coordination and increase proprioception to improve the patients ability to perform unlimited ADLs. 20 min Manual Therapy: PROM and gentle stretching into flexion, ABD and ER with oscillations to promote muscle relaxation   Rationale:      decrease pain, increase ROM, increase tissue extensibility and decrease edema  to improve patient's ability to improve PROM per protocol  The manual therapy interventions were performed at a separate and distinct time from the therapeutic activities interventions. Billed With/As:   [x] TE   [] TA   [] Neuro   [] Self Care Patient Education: [x] Review HEP    [] Progressed/Changed HEP based on:   [] positioning   [] body mechanics   [] transfers   [] heat/ice application    [] other:      Other Objective/Functional Measures: Added chair walkback stretch into scaption     Post Treatment Pain Level (on 0 to 10) scale:   0  / 10     ASSESSMENT  Assessment/Changes in Function:     Good tolerance to manual stretching with mild tightness felt at end range; no reports of pain throughout session. Discussed post op protocol and avoiding active motion of bicep that includes supination; pt arrived holding phon in L hand with minimal support at forearm. Was discouraged against this position; good verbal understanding. []  See Progress Note/Recertification   Patient will continue to benefit from skilled PT services to modify and progress therapeutic interventions, address functional mobility deficits, address ROM deficits, address strength deficits, analyze and address soft tissue restrictions, analyze and cue movement patterns, analyze and modify body mechanics/ergonomics and assess and modify postural abnormalities to attain remaining goals. Progress toward goals / Updated goals:    Progressing towards STG 3.       PLAN  [x]  Upgrade activities as tolerated YES Continue plan of care   []  Discharge due to :    []  Other:      Therapist: Russell Liang Domingo Valentin, DPT    Date: 5/27/2022 Time: 2:11 PM     Future Appointments   Date Time Provider Carlos Mustafa   5/31/2022  1:30 PM Norberto Cohen, PT [facility] [place] [address]   6/3/2022 11:45 AM Norberto Cohen, PT [facility] [place] [address]

## 2022-05-31 ENCOUNTER — HOSPITAL ENCOUNTER (OUTPATIENT)
Dept: PHYSICAL THERAPY | Age: 52
Discharge: HOME | End: 2022-05-31
Payer: OTHER GOVERNMENT

## 2022-05-31 PROCEDURE — 97140 MANUAL THERAPY 1/> REGIONS: CPT

## 2022-05-31 PROCEDURE — 97110 THERAPEUTIC EXERCISES: CPT

## 2022-05-31 NOTE — PROGRESS NOTES
PHYSICAL THERAPY - DAILY TREATMENT NOTE    Patient Name: Nadir Calloway        Date: 2022  : 1970   YES Patient  Verified  Visit #:   3   of   12  Insurance: Payor: MARC / Plan: Mychal Langston 74 / Product Type:  /      In time: 130 Out time: 215   Total Treatment Time: 45     BCBS/Medicare Time Tracking (below)   Total Timed Codes (min):  NA 1:1 Treatment Time:  NA     TREATMENT AREA =  Pain in left shoulder [M25.512]    SUBJECTIVE  Pain Level (on 0 to 10 scale):  0-1  / 10   Medication Changes/New allergies or changes in medical history, any new surgeries or procedures? NO    If yes, update Summary List   Subjective Functional Status/Changes:  []  No changes reported     I have some ache when I dont take tylenol but it goes away with ice or tylenol. Modalities Rationale:     decrease edema, decrease inflammation and decrease pain to improve patient's ability to perform pain-free ADLs.     min [] Estim, type/location:                                      []  att     []  unatt     []  w/US     []  w/ice    []  w/heat    min []  Mechanical Traction: type/lbs                   []  pro   []  sup   []  int   []  cont    []  before manual    []  after manual    min []  Ultrasound, settings/location:      min []  Iontophoresis w/ dexamethasone, location:                                               []  take home patch       []  in clinic   10 min [x]  Ice     []  Heat    location/position: L shoulder supine with pillow support    min []  Vasopneumatic Device, press/temp:    If using vaso (only need to measure limb vaso being performed on)      pre-treatment girth :       post-treatment girth :       measured at (landmark location) :      min []  Other:    [x] Skin assessment post-treatment (if applicable):    [x]  intact    [x]  redness- no adverse reaction                  []redness  adverse reaction:        15 min Therapeutic Exercise:  [x]  See flow sheet   Rationale: increase ROM, increase strength, improve coordination and increase proprioception to improve the patients ability to perform unlimited ADLs. 20 min Manual Therapy: PROM and gentle stretching into flexion, ABD and ER with oscillations to promote muscle relaxation   Rationale:      decrease pain, increase ROM, increase tissue extensibility and decrease edema  to improve patient's ability to improve PROM per protocol  The manual therapy interventions were performed at a separate and distinct time from the therapeutic activities interventions. Billed With/As:   [x] TE   [] TA   [] Neuro   [] Self Care Patient Education: [x] Review HEP    [] Progressed/Changed HEP based on:   [] positioning   [] body mechanics   [] transfers   [] heat/ice application    [] other:      Other Objective/Functional Measures:    PROM: 152 deg flex, 50 deg ER at 45 deg ABD, 70 deg abd   Post Treatment Pain Level (on 0 to 10) scale:   0  / 10     ASSESSMENT  Assessment/Changes in Function:     Good tolerance to all manual stretching today with only mild reports of tension with sustained stretch. No increase in pain during session. []  See Progress Note/Recertification   Patient will continue to benefit from skilled PT services to modify and progress therapeutic interventions, address functional mobility deficits, address ROM deficits, address strength deficits, analyze and address soft tissue restrictions, analyze and cue movement patterns, analyze and modify body mechanics/ergonomics and assess and modify postural abnormalities to attain remaining goals. Progress toward goals / Updated goals:    Progressing towards STG 3.       PLAN  [x]  Upgrade activities as tolerated YES Continue plan of care   []  Discharge due to :    []  Other:      Therapist: Sally Disla DPT    Date: 5/31/2022 Time: 2:11 PM     Future Appointments   Date Time Provider Carlos Mustafa   6/3/2022 11:45 AM Viktoriya Valdez, PT [facility] [place] [facility]   6/6/2022  5:00 PM Jing Coley, PT [facility]   6/10/2022 11:45 AM Jing Coley, PT [facility]   6/13/2022  5:00 PM Jing Coley, PT [facility]   6/16/2022 10:15 AM Jing Coley, PT [facility]   6/20/2022  5:00 PM Jing Coley, PT [facility]   6/24/2022  3:30 PM [facility]ing Coley, [facility]   6/27/2022  5:00 PM Jing Coley, PT [facility]   7/1/2022  3:30 PM Jing Coley, PT [facility]

## 2022-06-03 ENCOUNTER — HOSPITAL ENCOUNTER (OUTPATIENT)
Dept: PHYSICAL THERAPY | Age: 52
Discharge: HOME | End: 2022-06-03
Payer: OTHER GOVERNMENT

## 2022-06-03 PROCEDURE — 97140 MANUAL THERAPY 1/> REGIONS: CPT

## 2022-06-03 PROCEDURE — 97110 THERAPEUTIC EXERCISES: CPT

## 2022-06-06 ENCOUNTER — HOSPITAL ENCOUNTER (OUTPATIENT)
Dept: PHYSICAL THERAPY | Age: 52
Discharge: HOME | End: 2022-06-06
Payer: OTHER GOVERNMENT

## 2022-06-06 PROCEDURE — 97110 THERAPEUTIC EXERCISES: CPT

## 2022-06-06 PROCEDURE — 97140 MANUAL THERAPY 1/> REGIONS: CPT

## 2022-06-06 NOTE — PROGRESS NOTES
PHYSICAL THERAPY - DAILY TREATMENT NOTE    Patient Name: Monica Shaver        Date: 2022  : 1970   YES Patient  Verified  Visit #:      12  Insurance: Payor:  / Plan: Mychal Langston 74 / Product Type:  /      In time: 500 Out time: 545   Total Treatment Time: 45     BCBS/Medicare Time Tracking (below)   Total Timed Codes (min):  NA 1:1 Treatment Time:  NA     TREATMENT AREA =  Pain in left shoulder [M25.512]    SUBJECTIVE  Pain Level (on 0 to 10 scale):  0-1  / 10   Medication Changes/New allergies or changes in medical history, any new surgeries or procedures? NO    If yes, update Summary List   Subjective Functional Status/Changes:  []  No changes reported     I have some ache when I dont take my anti-inflammatory but otherwise pain free         Modalities Rationale:     decrease edema, decrease inflammation and decrease pain to improve patient's ability to perform pain-free ADLs.     min [] Estim, type/location:                                      []  att     []  unatt     []  w/US     []  w/ice    []  w/heat    min []  Mechanical Traction: type/lbs                   []  pro   []  sup   []  int   []  cont    []  before manual    []  after manual    min []  Ultrasound, settings/location:      min []  Iontophoresis w/ dexamethasone, location:                                               []  take home patch       []  in clinic   10 min [x]  Ice     []  Heat    location/position: L shoulder supine with pillow support    min []  Vasopneumatic Device, press/temp:    If using vaso (only need to measure limb vaso being performed on)      pre-treatment girth :       post-treatment girth :       measured at (landmark location) :      min []  Other:    [x] Skin assessment post-treatment (if applicable):    [x]  intact    [x]  redness- no adverse reaction                  []redness - adverse reaction:        15 min Therapeutic Exercise:  [x]  See flow sheet   Rationale: increase ROM, increase strength, improve coordination and increase proprioception to improve the patients ability to perform unlimited ADLs. 20 min Manual Therapy: PROM and gentle stretching into flexion, ABD and ER with oscillations to promote muscle relaxation   Rationale:      decrease pain, increase ROM, increase tissue extensibility and decrease edema  to improve patient's ability to improve PROM per protocol  The manual therapy interventions were performed at a separate and distinct time from the therapeutic activities interventions. Billed With/As:   [x] TE   [] TA   [] Neuro   [] Self Care Patient Education: [x] Review HEP    [] Progressed/Changed HEP based on:   [] positioning   [] body mechanics   [] transfers   [] heat/ice application    [] other:      Other Objective/Functional Measures:    TE per FS   Post Treatment Pain Level (on 0 to 10) scale:   0  / 10     ASSESSMENT  Assessment/Changes in Function:     Some stiffness noted initially with manual stretches but was able to achieve good range without ERP or pinching. Discussed gradual ween on sling as we are approaching 4 week nadir and importance of continuing with its use until then.      []  See Progress Note/Recertification   Patient will continue to benefit from skilled PT services to modify and progress therapeutic interventions, address functional mobility deficits, address ROM deficits, address strength deficits, analyze and address soft tissue restrictions, analyze and cue movement patterns, analyze and modify body mechanics/ergonomics and assess and modify postural abnormalities to attain remaining goals. Progress toward goals / Updated goals:    Progressing towards STG 3.       PLAN  [x]  Upgrade activities as tolerated YES Continue plan of care   []  Discharge due to :    []  Other:      Therapist: Lobito Vargas DPT    Date: 6/6/2022 Time: 2:11 PM     Future Appointments   Date Time Provider Carlos Mustafa   6/6/2022  5:00 PM Eliverto Areas, PT [facility]   6/10/2022 11:45 AM Eliverto Areas, PT [facility]   6/13/2022  5:00 PM Eliverto Areas, PT [facility]   6/16/2022 10:15 AM Eliverto Areas, PT [facility]   6/20/2022  5:00 PM Eliverto Areas, PT [facility]   6/24/2022  3:30 PM Eliverto Areas, PT [facility]   6/27/2022  5:00 PM Eliverto Areas, PT [facility]   7/1/2022  3:30 PM Eliverto Areas, PT [facility]

## 2022-06-10 ENCOUNTER — APPOINTMENT (OUTPATIENT)
Dept: PHYSICAL THERAPY | Age: 52
End: 2022-06-10
Payer: OTHER GOVERNMENT

## 2022-06-13 ENCOUNTER — HOSPITAL ENCOUNTER (OUTPATIENT)
Dept: PHYSICAL THERAPY | Age: 52
Discharge: HOME | End: 2022-06-13
Payer: OTHER GOVERNMENT

## 2022-06-13 PROCEDURE — 97110 THERAPEUTIC EXERCISES: CPT

## 2022-06-13 PROCEDURE — 97140 MANUAL THERAPY 1/> REGIONS: CPT

## 2022-06-13 NOTE — PROGRESS NOTES
PHYSICAL THERAPY - DAILY TREATMENT NOTE    Patient Name: Luis Bird        Date: 2022  : 1970   YES Patient  Verified  Visit #:     Insurance: Payor:  / Plan: Viv Blevins / Product Type:  /      In time: 500 Out time: 555   Total Treatment Time: 55     BCBS/Medicare Time Tracking (below)   Total Timed Codes (min):  NA 1:1 Treatment Time:  NA     TREATMENT AREA =  Pain in left shoulder [M25.512]    SUBJECTIVE  Pain Level (on 0 to 10 scale):  0-1  / 10   Medication Changes/New allergies or changes in medical history, any new surgeries or procedures? NO    If yes, update Summary List   Subjective Functional Status/Changes:  []  No changes reported     I have started weaning from my sling but I feel that I crave it after a few hours because my arm gets tired. Modalities Rationale:     decrease edema, decrease inflammation and decrease pain to improve patient's ability to perform pain-free ADLs.     min [] Estim, type/location:                                      []  att     []  unatt     []  w/US     []  w/ice    []  w/heat    min []  Mechanical Traction: type/lbs                   []  pro   []  sup   []  int   []  cont    []  before manual    []  after manual    min []  Ultrasound, settings/location:      min []  Iontophoresis w/ dexamethasone, location:                                               []  take home patch       []  in clinic   10 min [x]  Ice     []  Heat    location/position: L shoulder supine with pillow support    min []  Vasopneumatic Device, press/temp:    If using vaso (only need to measure limb vaso being performed on)      pre-treatment girth :       post-treatment girth :       measured at (landmark location) :      min []  Other:    [x] Skin assessment post-treatment (if applicable):    [x]  intact    [x]  redness- no adverse reaction                  []redness - adverse reaction:        35 min Therapeutic Exercise:  [x]  See flow sheet   Rationale:      increase ROM, increase strength, improve coordination and increase proprioception to improve the patients ability to perform unlimited ADLs. 10 min Manual Therapy: PROM and gentle stretching into flexion, ABD and ER with oscillations to promote muscle relaxation   Rationale:      decrease pain, increase ROM, increase tissue extensibility and decrease edema  to improve patient's ability to improve PROM per protocol  The manual therapy interventions were performed at a separate and distinct time from the therapeutic activities interventions. Billed With/As:   [x] TE   [] TA   [] Neuro   [] Self Care Patient Education: [x] Review HEP    [] Progressed/Changed HEP based on:   [] positioning   [] body mechanics   [] transfers   [] heat/ice application    [] other:      Other Objective/Functional Measures:    Initiated AAROM per protocol   Post Treatment Pain Level (on 0 to 10) scale:   0  / 10     ASSESSMENT  Assessment/Changes in Function:     Good tolerance to new exercises with reports of generalized fatigue by end of session. No reports of pain throughout. []  See Progress Note/Recertification   Patient will continue to benefit from skilled PT services to modify and progress therapeutic interventions, address functional mobility deficits, address ROM deficits, address strength deficits, analyze and address soft tissue restrictions, analyze and cue movement patterns, analyze and modify body mechanics/ergonomics and assess and modify postural abnormalities to attain remaining goals. Progress toward goals / Updated goals:    Progressing towards STG 3.       PLAN  [x]  Upgrade activities as tolerated YES Continue plan of care   []  Discharge due to :    []  Other:      Therapist: Wilbert Edward DPT    Date: 6/13/2022 Time: 2:11 PM     Future Appointments   Date Time Provider Carlos Mustafa   6/16/2022 10:15 AM Marcin Mujica PT [facility] [place] [facility]   6/20/2022  5:00 PM Marcin Mujica PT [facility]   6/24/2022  3:30 PM Eliverto Areas, PT [facility]   6/27/2022  5:00 PM Eliverto Areas, PT [facility]   7/1/2022  3:30 PM Eliverto Areas, PT [facility]

## 2022-06-20 ENCOUNTER — HOSPITAL ENCOUNTER (OUTPATIENT)
Dept: PHYSICAL THERAPY | Age: 52
Discharge: HOME | End: 2022-06-20
Payer: OTHER GOVERNMENT

## 2022-06-20 PROCEDURE — 97110 THERAPEUTIC EXERCISES: CPT

## 2022-06-20 PROCEDURE — 97140 MANUAL THERAPY 1/> REGIONS: CPT

## 2022-06-20 NOTE — PROGRESS NOTES
PHYSICAL THERAPY - DAILY TREATMENT NOTE    Patient Name: Lucio Russell        Date: 2022  : 1970   YES Patient  Verified  Visit #:     Insurance: Payor: MARC / Plan: Mychal Langston 74 / Product Type:  /      In time: 505 Out time: 555   Total Treatment Time: 45     BCBS/Medicare Time Tracking (below)   Total Timed Codes (min):  NA 1:1 Treatment Time:  NA     TREATMENT AREA =  Pain in left shoulder [M25.512]    SUBJECTIVE  Pain Level (on 0 to 10 scale):  0  / 10   Medication Changes/New allergies or changes in medical history, any new surgeries or procedures? NO    If yes, update Summary List   Subjective Functional Status/Changes:  []  No changes reported     When my arm gets tired I try some pendulums to help it relax. I haven't had much time for stretches this weekend so I feel a bit stiff. Modalities Rationale:     decrease edema, decrease inflammation and decrease pain to improve patient's ability to perform pain-free ADLs.     min [] Estim, type/location:                                      []  att     []  unatt     []  w/US     []  w/ice    []  w/heat    min []  Mechanical Traction: type/lbs                   []  pro   []  sup   []  int   []  cont    []  before manual    []  after manual    min []  Ultrasound, settings/location:      min []  Iontophoresis w/ dexamethasone, location:                                               []  take home patch       []  in clinic   HEP min [x]  Ice     []  Heat    location/position: L shoulder supine with pillow support    min []  Vasopneumatic Device, press/temp:    If using vaso (only need to measure limb vaso being performed on)      pre-treatment girth :       post-treatment girth :       measured at (landmark location) :      min []  Other:    [x] Skin assessment post-treatment (if applicable):    [x]  intact    [x]  redness- no adverse reaction                  []redness - adverse reaction:        35 min Therapeutic Exercise:  [x]  See flow sheet   Rationale:      increase ROM, increase strength, improve coordination and increase proprioception to improve the patients ability to perform unlimited ADLs. 10 min Manual Therapy: PROM and gentle stretching into flexion, ABD and ER with oscillations to promote muscle relaxation   Rationale:      decrease pain, increase ROM, increase tissue extensibility and decrease edema  to improve patient's ability to improve PROM per protocol  The manual therapy interventions were performed at a separate and distinct time from the therapeutic activities interventions. Billed With/As:   [x] TE   [] TA   [] Neuro   [] Self Care Patient Education: [x] Review HEP    [] Progressed/Changed HEP based on:   [] positioning   [] body mechanics   [] transfers   [] heat/ice application    [] other:      Other Objective/Functional Measures:    Aded supine punch and finger ladder    Post Treatment Pain Level (on 0 to 10) scale:   0  / 10     ASSESSMENT  Assessment/Changes in Function:     Some reports of mild pain during finger ladder in mid range of flexion; improved with cues for scapular placement and performing through scaption ROM. Good tolerance to flexion punch with good ER activation noted; fatigues quickly. []  See Progress Note/Recertification   Patient will continue to benefit from skilled PT services to modify and progress therapeutic interventions, address functional mobility deficits, address ROM deficits, address strength deficits, analyze and address soft tissue restrictions, analyze and cue movement patterns, analyze and modify body mechanics/ergonomics and assess and modify postural abnormalities to attain remaining goals. Progress toward goals / Updated goals:    Progressing towards STG 3.       PLAN  [x]  Upgrade activities as tolerated YES Continue plan of care   []  Discharge due to :    []  Other:      Therapist: Alan Hassan DPT    Date: 6/20/2022 Time: 2:11 PM     Future Appointments   Date Time Provider Carlos Mustafa   6/24/2022  3:30 PM [facility], PT [facility]   6/27/2022  5:00 PM [facility], PT [facility]   7/1/2022  3:30 PM [facility], PT [facility]

## 2022-06-24 ENCOUNTER — HOSPITAL ENCOUNTER (OUTPATIENT)
Dept: PHYSICAL THERAPY | Age: 52
Discharge: HOME | End: 2022-06-24
Payer: OTHER GOVERNMENT

## 2022-06-24 PROCEDURE — 97140 MANUAL THERAPY 1/> REGIONS: CPT

## 2022-06-24 PROCEDURE — 97110 THERAPEUTIC EXERCISES: CPT

## 2022-06-24 NOTE — PROGRESS NOTES
[address] [place] [address] PHYSICAL THERAPY AT [address] [place] [address] Yanira Plass 93, [address] [place] [address], [address]  Phone: [phone]  Fax: [phone]  PROGRESS NOTE  Patient Name: Karime Rodriguez : 1970   Treatment/Medical Diagnosis: Pain in left shoulder [M25.512]   Referral Source: Silvestre Jung MD     Date of Initial Visit: 2022 Attended Visits: 8 Missed Visits: 1     SUMMARY OF TREATMENT  PROM, AAROM and AROM of L shoulder, gentle stretching L shoulder, postural education, HEP prescription, manual therapy to improve mobility, CP for inflammation/pain control  CURRENT STATUS  Ms. Marie Driscoll is currently 6 weeks s/p L bicep tenodesis and SLAP repair. She has been making consistent gains with ROM and has remained pain-free throughout. She has begun sleeping without sling and is tolerating initiation of AAROM/AROM well with mild soreness reported the following day. Ms. Marie Driscoll is compliant with post-op protocol and lifting restrictions at this time and is reporting improved ease with waist to face ADLs. See below for objective measures:    Goal/Measure of Progress Goal Met? 1. Pt will be independent and compliant with initial HEP. Status at last Eval: - Current Status: Independent and compliant daily yes   2. Patient will report decreased pain levels to 0/10 in order to sleep through the night pain-free   Status at last Eval: 0-1/10  Current Status: 0/10 pain occasional soreness after completing HEP yes   3. Demonstrate improved L shoulder PROM to full and pain-free in order to safely progress to phase 2 of post-op protocol   Status at last Eval: 130 deg flex  20 deg ER   40 deg Ab Current Status: 160 deg flex  65 deg ER at 45 deg abd  90 deg ABD Progressing     New Goals to be achieved in __4__  weeks:  1. Pt will be independent and compliant with progressive HEP for L shoulder strength and stability in order to maintain gains made with physical therapy   2.   FOTO score by 10 points in order to indicate improved ease with ADLs. 3.  Demonstrate improved L shoulder strength to 4/5 to improve patients ability to resume recreational activity. 4.  Demonstrate improved L shoulder AROM to full and pain-free in order to eventually return to tennis. RECOMMENDATIONS  Continue with skilled PT services 2x/week for 4 weeks to progress to LTG and facilitate safe return to recreational activities. Thank you! If you have any questions/comments please contact us directly at [phone]. Thank you for allowing us to assist in the care of your patient. Therapist Signature: Palmer Pérez DPT Date: 6/24/2022     Time: 3:50 PM   NOTE TO PHYSICIAN:  PLEASE COMPLETE THE ORDERS BELOW AND FAX TO   [facility]: [phone]. If you are unable to process this request in 24 hours please contact our office: [phone].    ___ I have read the above report and request that my patient continue as recommended.   ___ I have read the above report and request that my patient continue therapy with the following changes/special instructions:_________________________________________________________   ___ I have read the above report and request that my patient be discharged from therapy.      Physician Signature:        Date:       Time:

## 2022-06-24 NOTE — PROGRESS NOTES
PHYSICAL THERAPY - DAILY TREATMENT NOTE    Patient Name: Mike Pyle        Date: 2022  : 1970   YES Patient  Verified  Visit #:     Insurance: Payor: MARC / Plan: Mychal Langston 74 / Product Type:  /      In time: 330 Out time: 415   Total Treatment Time: 45     BCBS/Medicare Time Tracking (below)   Total Timed Codes (min):  NA 1:1 Treatment Time:  NA     TREATMENT AREA =  Pain in left shoulder [M25.512]    SUBJECTIVE  Pain Level (on 0 to 10 scale):  0  / 10   Medication Changes/New allergies or changes in medical history, any new surgeries or procedures? NO    If yes, update Summary List   Subjective Functional Status/Changes:  []  No changes reported     I bumped my shoulder on a doorway yesterday, it hurt for about 1 minute but has been no problem since. Modalities Rationale:     decrease edema, decrease inflammation and decrease pain to improve patient's ability to perform pain-free ADLs.     min [] Estim, type/location:                                      []  att     []  unatt     []  w/US     []  w/ice    []  w/heat    min []  Mechanical Traction: type/lbs                   []  pro   []  sup   []  int   []  cont    []  before manual    []  after manual    min []  Ultrasound, settings/location:      min []  Iontophoresis w/ dexamethasone, location:                                               []  take home patch       []  in clinic   HEP min [x]  Ice     []  Heat    location/position: L shoulder supine with pillow support    min []  Vasopneumatic Device, press/temp:    If using vaso (only need to measure limb vaso being performed on)      pre-treatment girth :       post-treatment girth :       measured at (landmark location) :      min []  Other:    [x] Skin assessment post-treatment (if applicable):    [x]  intact    [x]  redness- no adverse reaction                  []redness - adverse reaction:        35 min Therapeutic Exercise:  [x]  See flow sheet Rationale:      increase ROM, increase strength, improve coordination and increase proprioception to improve the patients ability to perform unlimited ADLs. 10 min Manual Therapy: PROM and gentle stretching into flexion, ABD and ER with oscillations to promote muscle relaxation   Rationale:      decrease pain, increase ROM, increase tissue extensibility and decrease edema  to improve patient's ability to improve PROM per protocol  The manual therapy interventions were performed at a separate and distinct time from the therapeutic activities interventions. Billed With/As:   [x] TE   [] TA   [] Neuro   [] Self Care Patient Education: [x] Review HEP    [] Progressed/Changed HEP based on:   [] positioning   [] body mechanics   [] transfers   [] heat/ice application    [] other:      Other Objective/Functional Measures:    Aded supine punch elevated 40 deg and SL Er/abd   Post Treatment Pain Level (on 0 to 10) scale:   0  / 10     ASSESSMENT  Assessment/Changes in Function:     See PN to MD     []  See Progress Note/Recertification   Patient will continue to benefit from skilled PT services to modify and progress therapeutic interventions, address functional mobility deficits, address ROM deficits, address strength deficits, analyze and address soft tissue restrictions, analyze and cue movement patterns, analyze and modify body mechanics/ergonomics and assess and modify postural abnormalities to attain remaining goals. Progress toward goals / Updated goals:    See PN for progress towards goals.       PLAN  [x]  Upgrade activities as tolerated YES Continue plan of care   []  Discharge due to :    []  Other:      Therapist: Israel Alcazar DPT    Date: 6/24/2022 Time: 2:11 PM     Future Appointments   Date Time Provider Carlos Mustafa   6/24/2022  3:30 PM Wilber Virgen PT [facility]   6/27/2022  5:00 PM Wilber Virgen PT [facility]   7/1/2022  3:30 PM Wilber Virgen PT [facility]

## 2022-06-27 ENCOUNTER — HOSPITAL ENCOUNTER (OUTPATIENT)
Dept: PHYSICAL THERAPY | Age: 52
Discharge: HOME | End: 2022-06-27
Payer: OTHER GOVERNMENT

## 2022-06-27 PROCEDURE — 97110 THERAPEUTIC EXERCISES: CPT

## 2022-06-27 PROCEDURE — 97140 MANUAL THERAPY 1/> REGIONS: CPT

## 2022-06-27 NOTE — PROGRESS NOTES
PHYSICAL THERAPY - DAILY TREATMENT NOTE    Patient Name: Kaleb Robles        Date: 2022  : 1970   YES Patient  Verified  Visit #:     Insurance: Payor: MARC / Plan: Mychal Langston 74 / Product Type:  /      In time: 500 Out time: 600   Total Treatment Time: 55     BCBS/Medicare Time Tracking (below)   Total Timed Codes (min):  NA 1:1 Treatment Time:  NA     TREATMENT AREA =  Pain in left shoulder [M25.512]    SUBJECTIVE  Pain Level (on 0 to 10 scale):  0  / 10   Medication Changes/New allergies or changes in medical history, any new surgeries or procedures? NO    If yes, update Summary List   Subjective Functional Status/Changes:  []  No changes reported     I saw the doctor today and she is happy with my progress. Encouraged me to keep working on motion and begin strengthening the rotator cuff. Modalities Rationale:     decrease edema, decrease inflammation and decrease pain to improve patient's ability to perform pain-free ADLs.     min [] Estim, type/location:                                      []  att     []  unatt     []  w/US     []  w/ice    []  w/heat    min []  Mechanical Traction: type/lbs                   []  pro   []  sup   []  int   []  cont    []  before manual    []  after manual    min []  Ultrasound, settings/location:      min []  Iontophoresis w/ dexamethasone, location:                                               []  take home patch       []  in clinic   10 min [x]  Ice     []  Heat    location/position: L shoulder supine with pillow support    min []  Vasopneumatic Device, press/temp:    If using vaso (only need to measure limb vaso being performed on)      pre-treatment girth :       post-treatment girth :       measured at (landmark location) :      min []  Other:    [x] Skin assessment post-treatment (if applicable):    [x]  intact    [x]  redness- no adverse reaction                  []redness - adverse reaction:        35 min Therapeutic Exercise:  [x]  See flow sheet   Rationale:      increase ROM, increase strength, improve coordination and increase proprioception to improve the patients ability to perform unlimited ADLs. 10 min Manual Therapy: PROM and gentle stretching into flexion, ABD and ER with oscillations to promote muscle relaxation   Rationale:      decrease pain, increase ROM, increase tissue extensibility and decrease edema  to improve patient's ability to improve PROM per protocol  The manual therapy interventions were performed at a separate and distinct time from the therapeutic activities interventions. Billed With/As:   [x] TE   [] TA   [] Neuro   [] Self Care Patient Education: [x] Review HEP    [] Progressed/Changed HEP based on:   [] positioning   [] body mechanics   [] transfers   [] heat/ice application    [] other:      Other Objective/Functional Measures:    Performed ABD isometrics after some pain was reported with AROM in sidelying    Added prone row   Post Treatment Pain Level (on 0 to 10) scale:   0  / 10     ASSESSMENT  Assessment/Changes in Function:     Pt is progressing well and tolerating updates to exercises with muscle fatigue noted throughout. No increase in pain during session although pt reports popping/clicking with high reps; improves with cues for scapular placement. []  See Progress Note/Recertification   Patient will continue to benefit from skilled PT services to modify and progress therapeutic interventions, address functional mobility deficits, address ROM deficits, address strength deficits, analyze and address soft tissue restrictions, analyze and cue movement patterns, analyze and modify body mechanics/ergonomics and assess and modify postural abnormalities to attain remaining goals. Progress toward goals / Updated goals:    Progressing towards LTG 3.       PLAN  [x]  Upgrade activities as tolerated YES Continue plan of care   []  Discharge due to :    []  Other: Therapist: Blanquita Chau DPT    Date: 6/27/2022 Time: 2:11 PM     Future Appointments   Date Time Provider Carlos Mustafa   7/1/2022  3:30 PM Benny Carrillo, PT [facility]

## 2022-07-01 ENCOUNTER — APPOINTMENT (OUTPATIENT)
Dept: PHYSICAL THERAPY | Age: 52
End: 2022-07-01
Payer: OTHER GOVERNMENT

## 2022-07-06 ENCOUNTER — HOSPITAL ENCOUNTER (OUTPATIENT)
Dept: PHYSICAL THERAPY | Age: 52
Discharge: HOME | End: 2022-07-06
Payer: OTHER GOVERNMENT

## 2022-07-06 PROCEDURE — 97110 THERAPEUTIC EXERCISES: CPT

## 2022-07-06 PROCEDURE — 97140 MANUAL THERAPY 1/> REGIONS: CPT

## 2022-07-06 NOTE — PROGRESS NOTES
PHYSICAL THERAPY - DAILY TREATMENT NOTE    Patient Name: Asmita Garcia        Date: 2022  : 1970   YES Patient  Verified  Visit #:   10 of   12  Insurance: Payor: MARC / Plan: Mychal Langston 74 / Product Type:  /      In time: 0120 Out time: 125   Total Treatment Time: 55     BCBS/Medicare Time Tracking (below)   Total Timed Codes (min):  NA 1:1 Treatment Time:  NA     TREATMENT AREA =  Pain in left shoulder [M25.512]    SUBJECTIVE  Pain Level (on 0 to 10 scale):  0  / 10   Medication Changes/New allergies or changes in medical history, any new surgeries or procedures? NO    If yes, update Summary List   Subjective Functional Status/Changes:  []  No changes reported     I was pretty sore from last week, so I havent added any of those exercises at home. I still cant lift IV bags independently       Modalities Rationale:     decrease edema, decrease inflammation and decrease pain to improve patient's ability to perform pain-free ADLs.     min [] Estim, type/location:                                      []  att     []  unatt     []  w/US     []  w/ice    []  w/heat    min []  Mechanical Traction: type/lbs                   []  pro   []  sup   []  int   []  cont    []  before manual    []  after manual    min []  Ultrasound, settings/location:      min []  Iontophoresis w/ dexamethasone, location:                                               []  take home patch       []  in clinic   10 min [x]  Ice     []  Heat    location/position: L shoulder supine with pillow support    min []  Vasopneumatic Device, press/temp:    If using vaso (only need to measure limb vaso being performed on)      pre-treatment girth :       post-treatment girth :       measured at (landmark location) :      min []  Other:    [x] Skin assessment post-treatment (if applicable):    [x]  intact    [x]  redness- no adverse reaction                  []redness - adverse reaction:        35 min Therapeutic Exercise: [x]  See flow sheet   Rationale:      increase ROM, increase strength, improve coordination and increase proprioception to improve the patients ability to perform unlimited ADLs. 10 min Manual Therapy: PROM and gentle stretching into flexion, ABD and ER with oscillations to promote muscle relaxation   Rationale:      decrease pain, increase ROM, increase tissue extensibility and decrease edema  to improve patient's ability to improve PROM per protocol  The manual therapy interventions were performed at a separate and distinct time from the therapeutic activities interventions. Billed With/As:   [x] TE   [] TA   [] Neuro   [] Self Care Patient Education: [x] Review HEP    [] Progressed/Changed HEP based on:   [] positioning   [] body mechanics   [] transfers   [] heat/ice application    [] other:      Other Objective/Functional Measures: Added bicep curl and resumed SL ABD   Post Treatment Pain Level (on 0 to 10) scale:   0  / 10     ASSESSMENT  Assessment/Changes in Function:     Good tolerance to progression of exercises today without reports of excess soreness. Fatigue noted in scapular stabilizers with prone row. []  See Progress Note/Recertification   Patient will continue to benefit from skilled PT services to modify and progress therapeutic interventions, address functional mobility deficits, address ROM deficits, address strength deficits, analyze and address soft tissue restrictions, analyze and cue movement patterns, analyze and modify body mechanics/ergonomics and assess and modify postural abnormalities to attain remaining goals. Progress toward goals / Updated goals:    Progressing towards LTG 3.       PLAN  [x]  Upgrade activities as tolerated YES Continue plan of care   []  Discharge due to :    []  Other:      Therapist: Fay Barajas DPT    Date: 7/6/2022 Time: 2:11 PM     Future Appointments   Date Time Provider Carlos Mustafa   7/6/2022 12:30 PM Emanuel Burgess, PT [facility] 7/12/2022  3:00 PM Marizol Gomez, PT [facility] [place] [facility]   7/15/2022  3:30 PM Marizol Gomez, PT [facility]

## 2022-07-08 ENCOUNTER — HOSPITAL ENCOUNTER (OUTPATIENT)
Dept: PHYSICAL THERAPY | Age: 52
End: 2022-07-08
Payer: OTHER GOVERNMENT

## 2022-07-12 ENCOUNTER — APPOINTMENT (OUTPATIENT)
Dept: PHYSICAL THERAPY | Age: 52
End: 2022-07-12
Payer: OTHER GOVERNMENT

## 2022-07-15 ENCOUNTER — APPOINTMENT (OUTPATIENT)
Dept: PHYSICAL THERAPY | Age: 52
End: 2022-07-15
Payer: OTHER GOVERNMENT

## 2022-07-29 ENCOUNTER — HOSPITAL ENCOUNTER (OUTPATIENT)
Dept: PHYSICAL THERAPY | Age: 52
Discharge: HOME | End: 2022-07-29
Payer: OTHER GOVERNMENT

## 2022-07-29 PROCEDURE — 97110 THERAPEUTIC EXERCISES: CPT

## 2022-07-29 PROCEDURE — 97140 MANUAL THERAPY 1/> REGIONS: CPT

## 2022-07-29 NOTE — PROGRESS NOTES
[address] [place] [address] PHYSICAL THERAPY AT [address] [place] [address] Yanira Plass 07, [address] [place] [address], [address]  Phone: [phone]  Fax: [phone]  PROGRESS NOTE  Patient Name: Kaylee Antonio : 1970   Treatment/Medical Diagnosis: Pain in left shoulder [M25.512]   Referral Source: Frankie Skinner MD     Date of Initial Visit: 2022 Attended Visits: 11 Missed Visits: 4     SUMMARY OF TREATMENT  PROM, AAROM and AROM of L shoulder, gentle stretching L shoulder, postural education, HEP prescription, manual therapy to improve mobility, CP for inflammation/pain control  CURRENT STATUS  Ms. Benji Sanz is currently 7 weeks s/p L bicep tenodesis and slap repair. Pt has been seen for 2 visits in the past month due to busy work schedule and reports intermittent compliance with HEP. She is progressing well with ROM although strength remains limited due to poor attendance at PT; unable to progress appropriately as strength gains are made. Pt reports improved ease with waist to face ADLs, but is unable to perform any weighted requirements at work. Pt is now able to sleep comfortably on L and denies any residual soreness. She has been pain-free throughout treatment but reports occasional muscle soreness with HEP. See below for objective measures:     Goal/Measure of Progress Goal Met? 1. Pt will be independent and compliant with progressive HEP for L shoulder strength and stability in order to maintain gains made with physical therapy   Status at last Eval: - Current Status: Independent and compliant 3-5days/week yes   2. Demonstrate improved L shoulder PROM to full and pain-free in order to safely progress to phase 2 of post-op protocol   Status at last Eval: 160 deg flex  65 deg ER at 45 deg abd  90 deg ABD Current Status: 167 deg flex  60 deg ER at side  90 deg ABD with scapular stabilization yes   3. FOTO score by 10 points in order to indicate improved ease with ADLs.    Status at last Eval: 40 Current Status: 60 yes   4. Demonstrate improved L shoulder strength to 4/5 to improve patients ability to resume recreational activity. Status at last Eval: - Current Status: TBA Progressing      New Goals to be achieved in __4__  weeks:  1. Pt to be independent and compliant with progressive HEP in order to maintain gains made with PT.   2.  Demonstrate improved L shoulder strength to 4/5 to improve patients ability to resume recreational activity. 3.  Demonstrate improved L shoulder AROM to full and pain-free in order to facilitate safe return to recreational activity. 4.  Pt to demonstrate ability to perform 20 ball tosses without UT compensations in order to resume tennis. RECOMMENDATIONS  Continue with skilled PT services 2x/week for 4 weeks to meet above goals and facilitate safe return to recreational activity. If you have any questions/comments please contact us directly at [phone]. Thank you for allowing us to assist in the care of your patient. Therapist Signature: Berlin Ritchie DPT Date: 7/29/2022     Time: 3:00 PM   NOTE TO PHYSICIAN:  PLEASE COMPLETE THE ORDERS BELOW AND FAX TO   [facility]: [phone]. If you are unable to process this request in 24 hours please contact our office: [phone].    ___ I have read the above report and request that my patient continue as recommended.   ___ I have read the above report and request that my patient continue therapy with the following changes/special instructions:_________________________________________________________   ___ I have read the above report and request that my patient be discharged from therapy.      Physician Signature:        Date:       Time:

## 2022-07-29 NOTE — PROGRESS NOTES
PHYSICAL THERAPY - DAILY TREATMENT NOTE    Patient Name: Johnna Mckeon        Date: 2022  : 1970   YES Patient  Verified  Visit #:     Insurance: Payor: MARC / Plan: Mychal Langston 74 / Product Type:  /      In time: 250 Out time: 345   Total Treatment Time: 55     BCBS/Medicare Time Tracking (below)   Total Timed Codes (min):  NA 1:1 Treatment Time:  NA     TREATMENT AREA =  Pain in left shoulder [M25.512]    SUBJECTIVE  Pain Level (on 0 to 10 scale):  0  / 10   Medication Changes/New allergies or changes in medical history, any new surgeries or procedures? NO    If yes, update Summary List   Subjective Functional Status/Changes:  []  No changes reported     I have missed a few weeks of PT because work has been insanely busy and I am working until 9 pm. I have been doing my exercises at home but they are getting easy       Modalities Rationale:     decrease edema, decrease inflammation and decrease pain to improve patient's ability to perform pain-free ADLs.     min [] Estim, type/location:                                      []  att     []  unatt     []  w/US     []  w/ice    []  w/heat    min []  Mechanical Traction: type/lbs                   []  pro   []  sup   []  int   []  cont    []  before manual    []  after manual    min []  Ultrasound, settings/location:      min []  Iontophoresis w/ dexamethasone, location:                                               []  take home patch       []  in clinic   10 min [x]  Ice     []  Heat    location/position: L shoulder supine with pillow support    min []  Vasopneumatic Device, press/temp:    If using vaso (only need to measure limb vaso being performed on)      pre-treatment girth :       post-treatment girth :       measured at (landmark location) :      min []  Other:    [x] Skin assessment post-treatment (if applicable):    [x]  intact    [x]  redness- no adverse reaction                  []redness - adverse reaction: 35 min Therapeutic Exercise:  [x]  See flow sheet   Rationale:      increase ROM, increase strength, improve coordination and increase proprioception to improve the patients ability to perform unlimited ADLs. 10 min Manual Therapy: PROM and gentle stretching into flexion, ABD and ER with oscillations to promote muscle relaxation   Rationale:      decrease pain, increase ROM, increase tissue extensibility and decrease edema  to improve patient's ability to improve PROM per protocol  The manual therapy interventions were performed at a separate and distinct time from the therapeutic activities interventions. Billed With/As:   [x] TE   [] TA   [] Neuro   [] Self Care Patient Education: [x] Review HEP    [] Progressed/Changed HEP based on:   [] positioning   [] body mechanics   [] transfers   [] heat/ice application    [] other:      Other Objective/Functional Measures: Added Tband row, IR/ER, ball on wall, and standing scaption    Progressed to 3 lb bicep curl   Post Treatment Pain Level (on 0 to 10) scale:   0  / 10     ASSESSMENT  Assessment/Changes in Function:     See PN to MD     []  See Progress Note/Recertification   Patient will continue to benefit from skilled PT services to modify and progress therapeutic interventions, address functional mobility deficits, address ROM deficits, address strength deficits, analyze and address soft tissue restrictions, analyze and cue movement patterns, analyze and modify body mechanics/ergonomics and assess and modify postural abnormalities to attain remaining goals. Progress toward goals / Updated goals:    Progressing towards LTG 3. PLAN  [x]  Upgrade activities as tolerated YES Continue plan of care   []  Discharge due to :    []  Other:      Therapist: Milagros Lane DPT    Date: 7/29/2022 Time: 2:11 PM     No future appointments.

## 2022-08-23 ENCOUNTER — APPOINTMENT (OUTPATIENT)
Dept: PHYSICAL THERAPY | Age: 52
End: 2022-08-23
Payer: OTHER GOVERNMENT

## 2022-08-24 ENCOUNTER — APPOINTMENT (OUTPATIENT)
Dept: PHYSICAL THERAPY | Age: 52
End: 2022-08-24
Payer: OTHER GOVERNMENT

## 2022-08-26 ENCOUNTER — HOSPITAL ENCOUNTER (OUTPATIENT)
Dept: PHYSICAL THERAPY | Age: 52
Discharge: HOME | End: 2022-08-26
Payer: OTHER GOVERNMENT

## 2022-08-26 PROCEDURE — 97140 MANUAL THERAPY 1/> REGIONS: CPT

## 2022-08-26 PROCEDURE — 97110 THERAPEUTIC EXERCISES: CPT

## 2022-08-26 NOTE — PROGRESS NOTES
PHYSICAL THERAPY - DAILY TREATMENT NOTE    Patient Name: Carolina Thomson        Date: 2022  : 1970   YES Patient  Verified  Visit #:     Insurance: Payor: MARC / Plan: Sharlene Triana / Product Type:  /      In time: 335 Out time: 345   Total Treatment Time: 55     BCBS/Medicare Time Tracking (below)   Total Timed Codes (min):  NA 1:1 Treatment Time:  NA     TREATMENT AREA =  Pain in left shoulder [M25.512]    SUBJECTIVE  Pain Level (on 0 to 10 scale):  0  / 10   Medication Changes/New allergies or changes in medical history, any new surgeries or procedures? NO    If yes, update Summary List   Subjective Functional Status/Changes:  []  No changes reported     Over the past few weeks the home exercises have gotten easy. I have some twinges in my shoulder when reaching fast to the side. Modalities Rationale:     decrease edema, decrease inflammation and decrease pain to improve patient's ability to perform pain-free ADLs.     min [] Estim, type/location:                                      []  att     []  unatt     []  w/US     []  w/ice    []  w/heat    min []  Mechanical Traction: type/lbs                   []  pro   []  sup   []  int   []  cont    []  before manual    []  after manual    min []  Ultrasound, settings/location:      min []  Iontophoresis w/ dexamethasone, location:                                               []  take home patch       []  in clinic   10 min [x]  Ice     []  Heat    location/position: L shoulder supine with pillow support    min []  Vasopneumatic Device, press/temp:    If using vaso (only need to measure limb vaso being performed on)      pre-treatment girth :       post-treatment girth :       measured at (landmark location) :      min []  Other:    [x] Skin assessment post-treatment (if applicable):    [x]  intact    [x]  redness- no adverse reaction                  []redness - adverse reaction:        35 min Therapeutic Exercise:  [x]  See flow sheet   Rationale:      increase ROM, increase strength, improve coordination and increase proprioception to improve the patients ability to perform unlimited ADLs. 10 min Manual Therapy: PROM and gentle stretching into flexion, ABD and ER with oscillations to promote muscle relaxation   Rationale:      decrease pain, increase ROM, increase tissue extensibility and decrease edema  to improve patient's ability to improve PROM per protocol  The manual therapy interventions were performed at a separate and distinct time from the therapeutic activities interventions. Billed With/As:   [x] TE   [] TA   [] Neuro   [] Self Care Patient Education: [x] Review HEP    [] Progressed/Changed HEP based on:   [] positioning   [] body mechanics   [] transfers   [] heat/ice application    [] other:      Other Objective/Functional Measures: Added ball on wall, body blade and Push up on wall   Post Treatment Pain Level (on 0 to 10) scale:   0  / 10     ASSESSMENT  Assessment/Changes in Function:     Pt demonstrates good scapular control with all therex today; minimal cues required during standing scaption to prevent excessive scap elevation. Pt was unable to reproduce painful motions but was encouraged to avoid combination movements. []  See Progress Note/Recertification   Patient will continue to benefit from skilled PT services to modify and progress therapeutic interventions, address functional mobility deficits, address ROM deficits, address strength deficits, analyze and address soft tissue restrictions, analyze and cue movement patterns, analyze and modify body mechanics/ergonomics and assess and modify postural abnormalities to attain remaining goals. Progress toward goals / Updated goals:    Progressing towards LTG 3.       PLAN  [x]  Upgrade activities as tolerated YES Continue plan of care   []  Discharge due to :    []  Other:      Therapist: Fawad Holt DPT    Date: 8/26/2022 Time: 2:11 PM     Future Appointments   Date Time Provider Carlos Mustafa   9/6/2022  2:15 PM Kathie Mukherjee, PT [facility]   9/12/2022  3:30 PM Kathie Mukherjee, PT [facility]   9/16/2022  3:30 PM Kathie Mukherjee, PT [facility]   9/19/2022  5:00 PM Kathie Mukherjee, PT [facility]   9/22/2022  5:45 PM Kathie Mukherjee, PT [facility]   9/30/2022  2:45 PM Kathie Mukherjee, PT [facility]

## 2022-09-06 ENCOUNTER — HOSPITAL ENCOUNTER (OUTPATIENT)
Dept: PHYSICAL THERAPY | Age: 52
Discharge: HOME | End: 2022-09-06
Payer: OTHER GOVERNMENT

## 2022-09-06 PROCEDURE — 97140 MANUAL THERAPY 1/> REGIONS: CPT

## 2022-09-06 PROCEDURE — 97530 THERAPEUTIC ACTIVITIES: CPT

## 2022-09-06 PROCEDURE — 97110 THERAPEUTIC EXERCISES: CPT

## 2022-09-06 NOTE — PROGRESS NOTES
PHYSICAL THERAPY - DAILY TREATMENT NOTE    Patient Name: Sherri Richards        Date: 2022  : 1970   YES Patient  Verified  Visit #:   15 of   18  Insurance: Payor: MARC / Plan: Mychal Langston 74 / Product Type:  /      In time: 215 Out time: 320   Total Treatment Time: 60     BCBS/Medicare Time Tracking (below)   Total Timed Codes (min):  NA 1:1 Treatment Time:  NA     TREATMENT AREA =  Pain in left shoulder [M25.512]    SUBJECTIVE  Pain Level (on 0 to 10 scale):  0  / 10   Medication Changes/New allergies or changes in medical history, any new surgeries or procedures? NO    If yes, update Summary List   Subjective Functional Status/Changes:  []  No changes reported     I did my exercises consistently when I was out of town. I did play tennis today but all I did with my L hand was toss the ball for serving. Modalities Rationale:     decrease edema, decrease inflammation and decrease pain to improve patient's ability to perform pain-free ADLs.     min [] Estim, type/location:                                      []  att     []  unatt     []  w/US     []  w/ice    []  w/heat    min []  Mechanical Traction: type/lbs                   []  pro   []  sup   []  int   []  cont    []  before manual    []  after manual    min []  Ultrasound, settings/location:      min []  Iontophoresis w/ dexamethasone, location:                                               []  take home patch       []  in clinic   10 min [x]  Ice     []  Heat    location/position: L shoulder supine with pillow support    min []  Vasopneumatic Device, press/temp:    If using vaso (only need to measure limb vaso being performed on)      pre-treatment girth :       post-treatment girth :       measured at (landmark location) :      min []  Other:    [x] Skin assessment post-treatment (if applicable):    [x]  intact    [x]  redness- no adverse reaction                  []redness - adverse reaction:        20 min Therapeutic Exercise:  [x]  See flow sheet   Rationale:      increase ROM, increase strength, improve coordination and increase proprioception to improve the patients ability to perform unlimited ADLs. 10 min Manual Therapy: PROM and gentle stretching into flexion, ABD and ER with oscillations to promote muscle relaxation   Rationale:      decrease pain, increase ROM, increase tissue extensibility and decrease edema  to improve patient's ability to improve PROM per protocol  The manual therapy interventions were performed at a separate and distinct time from the therapeutic activities interventions. 10 min Therapeutic Activity: [x]  See flow sheet   Rationale:    increase ROM, increase strength, improve coordination, and increase proprioception to improve the patients ability to resume tennis. Billed With/As:   [x] TE   [] TA   [] Neuro   [] Self Care Patient Education: [x] Review HEP    [] Progressed/Changed HEP based on:   [] positioning   [] body mechanics   [] transfers   [] heat/ice application    [] other:      Other Objective/Functional Measures:    See PN to MD    Performed push ups on elevated plinth at chest height   Post Treatment Pain Level (on 0 to 10) scale:   0  / 10     ASSESSMENT  Assessment/Changes in Function:     See PNto MD     []  See Progress Note/Recertification   Patient will continue to benefit from skilled PT services to modify and progress therapeutic interventions, address functional mobility deficits, address ROM deficits, address strength deficits, analyze and address soft tissue restrictions, analyze and cue movement patterns, analyze and modify body mechanics/ergonomics and assess and modify postural abnormalities to attain remaining goals. Progress toward goals / Updated goals:    See PN for progress towards goals.       PLAN  [x]  Upgrade activities as tolerated YES Continue plan of care   []  Discharge due to :    []  Other:      Therapist: Fay Barajas DPT Date: 9/6/2022 Time: 2:11 PM     Future Appointments   Date Time Provider Carlos Mustafa   9/12/2022  3:30 PM Devin Mas, PT [facility]   9/16/2022  3:30 PM Devin Mas, PT [facility]   9/19/2022  5:00 PM Devin Mas, PT [facility]   9/22/2022  5:45 PM Devin Mas, PT [facility]   9/30/2022  2:45 PM Devin Mas, PT [facility]

## 2022-09-06 NOTE — PROGRESS NOTES
[address] [place] [address] PHYSICAL THERAPY AT [address] [place] [address]  Daniel Doyle [address], [address] [place] [address], [phone] [facility] [address]  Phone: [phone]  Fax: [phone]  PROGRESS NOTE  Patient Name: Jose Alfredo Rosen : 1970   Treatment/Medical Diagnosis: Pain in left shoulder [M25.512]   Referral Source: Erica Frey MD     Date of Initial Visit: 2022 Attended Visits: 13 Missed Visits: 4     SUMMARY OF TREATMENT  PROM, AAROM and AROM of L shoulder, gentle stretching L shoulder, postural education, HEP prescription, manual therapy to improve mobility, CP for inflammation/pain control  CURRENT STATUS  Ms. Santi Garcia is currently 16 weeks s/p bicep tenodesis and slap repair on 2022. Pt progress has been fluctuating as pt is not consistent with HEP and has poor attendance record. However, in recent weeks, pt is making steady strides in both ROM and strength. PROM is near full, but pt continues to struggle with AROM past 120 elevation reporting end range pain and a painful arc of motion. Scapular strength remains limited although RC strength and proprioception is consistently improving. Pt reported she played tennis today although she was not cleared by MD or PT. LUE is used for ball toss and assistance with ; pt had no residual soreness but showed poor tolerance/endurance with ball tosses in gym. See below for objective measures:     Goal/Measure of Progress Goal Met? 1.  Pt to be independent and compliant with progressive HEP in order to maintain gains made with PT. Status at last Eval: - Current Status: Independent, compliant 1-3x/week Progressing   2. Demonstrate improved L shoulder strength to 4/5 to improve patients ability to resume recreational activity. Status at last Eval: - Current Status: Flex 3+/5  Abd 3+/5 p! ER 4-/5  IR 4/5  Elbow flex 4/5  Mid trap/rhomboids 3+/5 Progressing   3.   Demonstrate improved L shoulder AROM to full and pain-free in order to facilitate safe return to recreational activity. Status at last Eval: - Current Status: Approx 120 deg elevation with ERP Progressing   4. Pt to demonstrate ability to perform 20 ball tosses without UT compensations in order to resume tennis. Status at last Eval: - Current Status: 5 before onset of fatigue/discomfort Progressing     New Goals to be achieved in __2-4__  weeks: AS ABOVE    RECOMMENDATIONS  Continue with skilled PT services 2x/week for 2-4 weeks to improve independence with HEP and facilitate safe return to recreational activity. Thank you! If you have any questions/comments please contact us directly at [phone]. Thank you for allowing us to assist in the care of your patient. Therapist Signature: Samira Carvalho DPT Date: 9/6/2022     Time: 2:20 PM   NOTE TO PHYSICIAN:  PLEASE COMPLETE THE ORDERS BELOW AND FAX TO   [facility]: [phone]. If you are unable to process this request in 24 hours please contact our office: [phone].    ___ I have read the above report and request that my patient continue as recommended.   ___ I have read the above report and request that my patient continue therapy with the following changes/special instructions:_________________________________________________________   ___ I have read the above report and request that my patient be discharged from therapy.      Physician Signature:        Date:       Time:

## 2022-09-12 ENCOUNTER — APPOINTMENT (OUTPATIENT)
Dept: PHYSICAL THERAPY | Age: 52
End: 2022-09-12
Payer: OTHER GOVERNMENT

## 2022-09-16 ENCOUNTER — HOSPITAL ENCOUNTER (OUTPATIENT)
Dept: PHYSICAL THERAPY | Age: 52
Discharge: HOME | End: 2022-09-16
Payer: OTHER GOVERNMENT

## 2022-09-16 PROCEDURE — 97110 THERAPEUTIC EXERCISES: CPT

## 2022-09-16 PROCEDURE — 97530 THERAPEUTIC ACTIVITIES: CPT

## 2022-09-16 PROCEDURE — 97140 MANUAL THERAPY 1/> REGIONS: CPT

## 2022-09-16 NOTE — PROGRESS NOTES
PHYSICAL THERAPY - DAILY TREATMENT NOTE    Patient Name: Rupali Book        Date: 2022  : 1970   YES Patient  Verified  Visit #:   15 of   18  Insurance: Payor: MARC / Plan: Mychal Langston 74 / Product Type:  /      In time: 335 Out time: 430   Total Treatment Time: 55     BCBS/Medicare Time Tracking (below)   Total Timed Codes (min):  NA 1:1 Treatment Time:  NA     TREATMENT AREA =  Pain in left shoulder [M25.512]    SUBJECTIVE  Pain Level (on 0 to 10 scale):  0  / 10   Medication Changes/New allergies or changes in medical history, any new surgeries or procedures? NO    If yes, update Summary List   Subjective Functional Status/Changes:  []  No changes reported     My MD says I have no restrictions but is concerned my RC is weak. Modalities Rationale:     decrease edema, decrease inflammation and decrease pain to improve patient's ability to perform pain-free ADLs.     min [] Estim, type/location:                                      []  att     []  unatt     []  w/US     []  w/ice    []  w/heat    min []  Mechanical Traction: type/lbs                   []  pro   []  sup   []  int   []  cont    []  before manual    []  after manual    min []  Ultrasound, settings/location:      min []  Iontophoresis w/ dexamethasone, location:                                               []  take home patch       []  in clinic   10 min [x]  Ice     []  Heat    location/position: L shoulder supine with pillow support    min []  Vasopneumatic Device, press/temp:    If using vaso (only need to measure limb vaso being performed on)      pre-treatment girth :       post-treatment girth :       measured at (landmark location) :      min []  Other:    [x] Skin assessment post-treatment (if applicable):    [x]  intact    [x]  redness- no adverse reaction                  []redness - adverse reaction:        20 min Therapeutic Exercise:  [x]  See flow sheet   Rationale:      increase ROM, increase strength, improve coordination and increase proprioception to improve the patients ability to perform unlimited ADLs. 10 min Manual Therapy: PROM and gentle stretching into flexion, ABD and ER with oscillations to promote muscle relaxation   Rationale:      decrease pain, increase ROM, increase tissue extensibility and decrease edema  to improve patient's ability to improve PROM per protocol  The manual therapy interventions were performed at a separate and distinct time from the therapeutic activities interventions. 15 min Therapeutic Activity: [x]  See flow sheet  Practiced 2x10 ball toss with target and emphasis on scaption ROM   Rationale:    increase ROM, increase strength, improve coordination, and increase proprioception to improve the patients ability to resume tennis. Billed With/As:   [x] TE   [] TA   [] Neuro   [] Self Care Patient Education: [x] Review HEP    [] Progressed/Changed HEP based on:   [] positioning   [] body mechanics   [] transfers   [] heat/ice application    [] other:      Other Objective/Functional Measures: Added FR on wall and ball toss   Post Treatment Pain Level (on 0 to 10) scale:   0  / 10     ASSESSMENT  Assessment/Changes in Function:     Some discomfort noted when retrieving ball/quick motion but pt otherwise pain free throughout session. RC remains weak and pt fatigues quickly which causes poor proprioceptive control of scapulothoracic joint. []  See Progress Note/Recertification   Patient will continue to benefit from skilled PT services to modify and progress therapeutic interventions, address functional mobility deficits, address ROM deficits, address strength deficits, analyze and address soft tissue restrictions, analyze and cue movement patterns, analyze and modify body mechanics/ergonomics and assess and modify postural abnormalities to attain remaining goals. Progress toward goals / Updated goals:    Progressing towards LTG 3. PLAN  [x]  Upgrade activities as tolerated YES Continue plan of care   []  Discharge due to :    []  Other:      Therapist: Sarina Caraballo DPT    Date: 9/16/2022 Time: 2:11 PM     Future Appointments   Date Time Provider Carlos Mustafa   9/16/2022  3:30 PM Uday Beltran, PT [facility]   9/20/2022  1:30 PM Uday Beltran, PT [facility]   9/22/2022  5:45 PM Uday Beltran, PT [facility]   9/30/2022  2:45 PM Uday Beltran, PT [facility]

## 2022-09-19 ENCOUNTER — APPOINTMENT (OUTPATIENT)
Dept: PHYSICAL THERAPY | Age: 52
End: 2022-09-19
Payer: OTHER GOVERNMENT

## 2022-09-20 ENCOUNTER — APPOINTMENT (OUTPATIENT)
Dept: PHYSICAL THERAPY | Age: 52
End: 2022-09-20
Payer: OTHER GOVERNMENT